# Patient Record
Sex: MALE | Race: WHITE | Employment: OTHER | URBAN - METROPOLITAN AREA
[De-identification: names, ages, dates, MRNs, and addresses within clinical notes are randomized per-mention and may not be internally consistent; named-entity substitution may affect disease eponyms.]

---

## 2020-09-01 ENCOUNTER — OFFICE VISIT (OUTPATIENT)
Dept: INTERNAL MEDICINE CLINIC | Facility: CLINIC | Age: 70
End: 2020-09-01
Payer: MEDICARE

## 2020-09-01 VITALS
HEART RATE: 67 BPM | SYSTOLIC BLOOD PRESSURE: 125 MMHG | DIASTOLIC BLOOD PRESSURE: 80 MMHG | HEIGHT: 71 IN | OXYGEN SATURATION: 96 % | WEIGHT: 199 LBS | BODY MASS INDEX: 27.86 KG/M2

## 2020-09-01 DIAGNOSIS — M17.0 PRIMARY OSTEOARTHRITIS OF BOTH KNEES: ICD-10-CM

## 2020-09-01 DIAGNOSIS — E78.5 DYSLIPIDEMIA: ICD-10-CM

## 2020-09-01 DIAGNOSIS — R06.00 DOE (DYSPNEA ON EXERTION): Primary | ICD-10-CM

## 2020-09-01 DIAGNOSIS — K21.9 GASTROESOPHAGEAL REFLUX DISEASE WITHOUT ESOPHAGITIS: ICD-10-CM

## 2020-09-01 DIAGNOSIS — Z00.00 ADULT GENERAL MEDICAL EXAM: ICD-10-CM

## 2020-09-01 PROBLEM — F32.A DEPRESSION: Status: ACTIVE | Noted: 2020-09-01

## 2020-09-01 PROBLEM — A60.01 HERPES SIMPLEX INFECTION OF PENIS: Status: ACTIVE | Noted: 2020-09-01

## 2020-09-01 PROCEDURE — 99204 OFFICE O/P NEW MOD 45 MIN: CPT | Performed by: INTERNAL MEDICINE

## 2020-09-01 RX ORDER — DICLOFENAC SODIUM 75 MG/1
1 TABLET, DELAYED RELEASE ORAL DAILY
COMMUNITY
Start: 2020-08-17 | End: 2020-10-13

## 2020-09-01 RX ORDER — VALACYCLOVIR HYDROCHLORIDE 1 G/1
1 TABLET, FILM COATED ORAL AS NEEDED
COMMUNITY
Start: 2020-08-16

## 2020-09-01 RX ORDER — COVID-19 ANTIGEN TEST
220 KIT MISCELLANEOUS
COMMUNITY
End: 2020-10-13

## 2020-09-01 RX ORDER — OMEPRAZOLE 20 MG/1
1 CAPSULE, DELAYED RELEASE ORAL DAILY
COMMUNITY
Start: 2020-08-17

## 2020-09-01 RX ORDER — BUPROPION HYDROCHLORIDE 150 MG/1
1 TABLET ORAL DAILY
COMMUNITY
Start: 2020-08-16 | End: 2021-01-26

## 2020-09-01 RX ORDER — ATORVASTATIN CALCIUM 10 MG/1
1 TABLET, FILM COATED ORAL NIGHTLY
COMMUNITY
Start: 2020-08-16 | End: 2021-06-15

## 2020-09-01 RX ORDER — ESCITALOPRAM OXALATE 20 MG/1
1 TABLET ORAL DAILY
COMMUNITY
Start: 2020-07-12 | End: 2021-06-15

## 2020-09-01 NOTE — PROGRESS NOTES
Amelia Markham is a 78year old male. Patient presents with:  Establish Care: needs new PCP  Breathing Problem: with exertion  Urinary Frequency    HPI:   26-year-old gentleman with past medical history of dyslipidemia, GERD, DJD, depression here to establish Frequency: 4 or more times a week      Drinks per session: 1 or 2    Drug use: Never     Family History   Problem Relation Age of Onset   • Heart Disorder Father    • Other (alzhiemers) Mother       No Known Allergies     REVIEW OF SYSTEMS:     Review of S Neurological: He is alert and oriented to person, place, and time. No cranial nerve deficit. Coordination normal.   Skin: Skin is warm and dry. Psychiatric: He has a normal mood and affect.  His behavior is normal. Judgment normal.         ASSESSMENT AN

## 2020-09-14 ENCOUNTER — APPOINTMENT (OUTPATIENT)
Dept: LAB | Facility: HOSPITAL | Age: 70
End: 2020-09-14
Attending: INTERNAL MEDICINE
Payer: MEDICARE

## 2020-09-14 DIAGNOSIS — R06.00 DOE (DYSPNEA ON EXERTION): ICD-10-CM

## 2020-09-15 LAB — SARS-COV-2 RNA RESP QL NAA+PROBE: NOT DETECTED

## 2020-09-16 ENCOUNTER — HOSPITAL ENCOUNTER (OUTPATIENT)
Dept: CV DIAGNOSTICS | Facility: HOSPITAL | Age: 70
Discharge: HOME | End: 2020-09-16
Attending: INTERNAL MEDICINE
Payer: MEDICARE

## 2020-09-16 DIAGNOSIS — R06.00 DOE (DYSPNEA ON EXERTION): ICD-10-CM

## 2020-09-16 PROCEDURE — 93017 CV STRESS TEST TRACING ONLY: CPT | Performed by: INTERNAL MEDICINE

## 2020-09-16 PROCEDURE — 93016 CV STRESS TEST SUPVJ ONLY: CPT | Performed by: INTERNAL MEDICINE

## 2020-09-16 PROCEDURE — 93350 STRESS TTE ONLY: CPT | Performed by: INTERNAL MEDICINE

## 2020-09-16 PROCEDURE — 93018 CV STRESS TEST I&R ONLY: CPT | Performed by: INTERNAL MEDICINE

## 2020-09-18 NOTE — PROGRESS NOTES
Dr Nicholas=please check cardiology tab-under EKG echo=click the EKG tracing and you will find the stress test, if this is not the one that you are looking for, send it back to us Triage.     EKG PACS Images      Show images for CARD EKG ECHO TRACING      Ca

## 2020-09-21 ENCOUNTER — MED REC SCAN ONLY (OUTPATIENT)
Dept: INTERNAL MEDICINE CLINIC | Facility: CLINIC | Age: 70
End: 2020-09-21

## 2020-10-01 ENCOUNTER — LAB ENCOUNTER (OUTPATIENT)
Dept: LAB | Facility: HOSPITAL | Age: 70
End: 2020-10-01
Attending: INTERNAL MEDICINE
Payer: MEDICARE

## 2020-10-01 DIAGNOSIS — Z00.00 ADULT GENERAL MEDICAL EXAM: ICD-10-CM

## 2020-10-01 PROCEDURE — 80053 COMPREHEN METABOLIC PANEL: CPT

## 2020-10-01 PROCEDURE — 83036 HEMOGLOBIN GLYCOSYLATED A1C: CPT

## 2020-10-01 PROCEDURE — 85027 COMPLETE CBC AUTOMATED: CPT

## 2020-10-01 PROCEDURE — 36415 COLL VENOUS BLD VENIPUNCTURE: CPT

## 2020-10-01 PROCEDURE — 84443 ASSAY THYROID STIM HORMONE: CPT

## 2020-10-01 PROCEDURE — 80061 LIPID PANEL: CPT

## 2020-10-01 NOTE — PROGRESS NOTES
Blood work reviewed. Blood counts, kidney function, liver function are normal limits. No evidence of diabetes. Cholesterol numbers are in acceptable levels. Thyroid test is normal.    Prostate number called PSA slightly in the higher side.   I have plac

## 2020-10-02 ENCOUNTER — TELEPHONE (OUTPATIENT)
Dept: INTERNAL MEDICINE CLINIC | Facility: CLINIC | Age: 70
End: 2020-10-02

## 2020-10-02 DIAGNOSIS — Z20.822 SUSPECTED COVID-19 VIRUS INFECTION: Primary | ICD-10-CM

## 2020-10-02 NOTE — TELEPHONE ENCOUNTER
While speaking with patient RE: test results, patient reports he is going to [facility] for a rapid Covid test, as he is trying to get on a flight to [place].     \"Tell Dr. Jesus Alberto Lopez thank you for speaking to me this morning, but they told me it would be 3

## 2020-10-12 ENCOUNTER — OFFICE VISIT (OUTPATIENT)
Dept: SURGERY | Facility: CLINIC | Age: 70
End: 2020-10-12
Payer: MEDICARE

## 2020-10-12 VITALS
HEIGHT: 71 IN | RESPIRATION RATE: 16 BRPM | WEIGHT: 199 LBS | DIASTOLIC BLOOD PRESSURE: 80 MMHG | BODY MASS INDEX: 27.86 KG/M2 | SYSTOLIC BLOOD PRESSURE: 132 MMHG | HEART RATE: 60 BPM

## 2020-10-12 DIAGNOSIS — R97.20 ELEVATED PSA: Primary | ICD-10-CM

## 2020-10-12 DIAGNOSIS — R35.1 BENIGN PROSTATIC HYPERPLASIA WITH NOCTURIA: ICD-10-CM

## 2020-10-12 DIAGNOSIS — N40.1 BENIGN PROSTATIC HYPERPLASIA WITH NOCTURIA: ICD-10-CM

## 2020-10-12 PROCEDURE — G0463 HOSPITAL OUTPT CLINIC VISIT: HCPCS | Performed by: NURSE PRACTITIONER

## 2020-10-12 PROCEDURE — 99204 OFFICE O/P NEW MOD 45 MIN: CPT | Performed by: NURSE PRACTITIONER

## 2020-10-12 RX ORDER — DICLOFENAC SODIUM 75 MG/1
TABLET, DELAYED RELEASE ORAL
COMMUNITY
Start: 2020-08-17 | End: 2021-07-01

## 2020-10-12 RX ORDER — SILDENAFIL 50 MG/1
50 TABLET, FILM COATED ORAL
COMMUNITY
Start: 2019-10-21

## 2020-10-12 RX ORDER — TAMSULOSIN HYDROCHLORIDE 0.4 MG/1
0.4 CAPSULE ORAL DAILY
Qty: 90 CAPSULE | Refills: 0 | Status: SHIPPED | OUTPATIENT
Start: 2020-10-12 | End: 2020-11-11

## 2020-10-12 NOTE — PROGRESS NOTES
HPI:    Patient ID: Robert Chandler is a 78year old male. HPI     Patient is a 78year old male who presents to the clinic for a consult regarding elevated PSA. Past medical history of anxieity depression, RLS, and HEYDI.     Patient previously had his medic Current Outpatient Medications   Medication Sig Dispense Refill   • atorvastatin 10 MG Oral Tab Take 1 tablet by mouth nightly. • buPROPion HCl ER, XL, 150 MG Oral Tablet 24 Hr Take 1 tablet by mouth daily.      • Diclofenac Sodium 75 MG Oral Ta 5. 22 ng/ml  20 PSA 4.09 ng/ml  10/21/19 PSA 5.22 ng/ml  19 PSA 5.65 ng/ml  18 PSA 4.22 ng/ml  18 PSA 4.61 ng/ml  18 PSA 3.42 ng/ml  3/13/18 PSA 4.21 ng/ml  10/3/17 PSA 5.15 ng/ml    Review of Imagin20 CT Chest without contra prescriptions requested or ordered in this encounter       Imaging & Referrals:  None        XZ#[id number]

## 2020-10-13 ENCOUNTER — MED REC SCAN ONLY (OUTPATIENT)
Dept: INTERNAL MEDICINE CLINIC | Facility: CLINIC | Age: 70
End: 2020-10-13

## 2020-10-13 ENCOUNTER — OFFICE VISIT (OUTPATIENT)
Dept: INTERNAL MEDICINE CLINIC | Facility: CLINIC | Age: 70
End: 2020-10-13
Payer: MEDICARE

## 2020-10-13 VITALS
BODY MASS INDEX: 33 KG/M2 | DIASTOLIC BLOOD PRESSURE: 77 MMHG | OXYGEN SATURATION: 96 % | WEIGHT: 236 LBS | TEMPERATURE: 98 F | SYSTOLIC BLOOD PRESSURE: 119 MMHG | HEART RATE: 60 BPM

## 2020-10-13 DIAGNOSIS — R97.20 ELEVATED PSA: ICD-10-CM

## 2020-10-13 DIAGNOSIS — Z23 NEED FOR INFLUENZA VACCINATION: ICD-10-CM

## 2020-10-13 DIAGNOSIS — E78.5 DYSLIPIDEMIA: Primary | ICD-10-CM

## 2020-10-13 DIAGNOSIS — E66.09 OBESITY DUE TO EXCESS CALORIES WITHOUT SERIOUS COMORBIDITY, UNSPECIFIED CLASSIFICATION: ICD-10-CM

## 2020-10-13 DIAGNOSIS — K21.9 GASTROESOPHAGEAL REFLUX DISEASE WITHOUT ESOPHAGITIS: ICD-10-CM

## 2020-10-13 PROCEDURE — G0008 ADMIN INFLUENZA VIRUS VAC: HCPCS | Performed by: INTERNAL MEDICINE

## 2020-10-13 PROCEDURE — 90662 IIV NO PRSV INCREASED AG IM: CPT | Performed by: INTERNAL MEDICINE

## 2020-10-13 PROCEDURE — G0463 HOSPITAL OUTPT CLINIC VISIT: HCPCS | Performed by: INTERNAL MEDICINE

## 2020-10-13 PROCEDURE — 99214 OFFICE O/P EST MOD 30 MIN: CPT | Performed by: INTERNAL MEDICINE

## 2020-10-13 NOTE — PROGRESS NOTES
Ayden Lobo is a 78year old male. Patient presents with: Follow - Up: dyspenea /flu vaccine  Follow-up for dyspnea, chronic medical conditions  HPI:   65-year-old gentleman with a past medical history of GERD, dyslipidemia, depression here for follow-up. Age of Onset   • Heart Disorder Father    • Other (alzhiemers) Mother       No Known Allergies     REVIEW OF SYSTEMS:     Review of Systems   Constitutional: Negative for activity change, appetite change and fever.    HENT: Negative for congestion, facial s unspecified classification  Discussed regarding increasing activity. Increase activity as tolerated. Cut down on calories and carbohydrates. He does have prediabetes. He informed me that he will start playing tennis    5.  Elevated PSA  Follows up with

## 2021-01-06 RX ORDER — TAMSULOSIN HYDROCHLORIDE 0.4 MG/1
CAPSULE ORAL
Qty: 90 CAPSULE | Refills: 0 | Status: SHIPPED | OUTPATIENT
Start: 2021-01-06 | End: 2021-04-16

## 2021-01-06 NOTE — TELEPHONE ENCOUNTER
Received refill request for tamsulosin 0.4mg capsules    Patient has been seen within the last 12 months but last PSA from  10/2020 was elevated    Routed to JEFF Neri for review and advise   Medication pended if appropriate to sign

## 2021-01-26 RX ORDER — BUPROPION HYDROCHLORIDE 150 MG/1
150 TABLET ORAL DAILY
Qty: 90 TABLET | Refills: 0 | Status: SHIPPED | OUTPATIENT
Start: 2021-01-26 | End: 2021-05-14

## 2021-02-09 ENCOUNTER — ORDER TRANSCRIPTION (OUTPATIENT)
Dept: PHYSICAL THERAPY | Facility: HOSPITAL | Age: 71
End: 2021-02-09

## 2021-02-09 ENCOUNTER — OFFICE VISIT (OUTPATIENT)
Dept: INTERNAL MEDICINE CLINIC | Facility: CLINIC | Age: 71
End: 2021-02-09
Payer: MEDICARE

## 2021-02-09 VITALS
RESPIRATION RATE: 14 BRPM | WEIGHT: 237 LBS | OXYGEN SATURATION: 97 % | HEIGHT: 71 IN | DIASTOLIC BLOOD PRESSURE: 82 MMHG | SYSTOLIC BLOOD PRESSURE: 120 MMHG | HEART RATE: 74 BPM | BODY MASS INDEX: 33.18 KG/M2

## 2021-02-09 DIAGNOSIS — M17.0 PRIMARY OSTEOARTHRITIS OF BOTH KNEES: ICD-10-CM

## 2021-02-09 DIAGNOSIS — N40.0 BENIGN PROSTATIC HYPERPLASIA WITHOUT LOWER URINARY TRACT SYMPTOMS: ICD-10-CM

## 2021-02-09 DIAGNOSIS — N40.0 BENIGN PROSTATIC HYPERPLASIA, UNSPECIFIED WHETHER LOWER URINARY TRACT SYMPTOMS PRESENT: ICD-10-CM

## 2021-02-09 DIAGNOSIS — F32.5 MAJOR DEPRESSIVE DISORDER IN FULL REMISSION, UNSPECIFIED WHETHER RECURRENT (HCC): ICD-10-CM

## 2021-02-09 DIAGNOSIS — R42 DIZZINESS: Primary | ICD-10-CM

## 2021-02-09 DIAGNOSIS — K21.9 GASTROESOPHAGEAL REFLUX DISEASE WITHOUT ESOPHAGITIS: ICD-10-CM

## 2021-02-09 DIAGNOSIS — A60.01 HERPES SIMPLEX INFECTION OF PENIS: ICD-10-CM

## 2021-02-09 DIAGNOSIS — E66.09 OBESITY DUE TO EXCESS CALORIES WITHOUT SERIOUS COMORBIDITY, UNSPECIFIED CLASSIFICATION: ICD-10-CM

## 2021-02-09 DIAGNOSIS — R97.20 ELEVATED PSA: ICD-10-CM

## 2021-02-09 DIAGNOSIS — E78.5 DYSLIPIDEMIA: ICD-10-CM

## 2021-02-09 PROCEDURE — G0439 PPPS, SUBSEQ VISIT: HCPCS | Performed by: INTERNAL MEDICINE

## 2021-02-10 NOTE — PROGRESS NOTES
HPI:   Irma Rebolledo is a 69year old male who presents for a Medicare Subsequent Annual Wellness visit (Pt already had Initial Annual Wellness). 67-year-old gentleman here for Medicare annual wellness visit. He was living in [place].   He has no complain osteoarthritis of both knees     Herpes simplex infection of penis     Depression     Obesity due to excess calories without serious comorbidity     Benign prostatic hyperplasia without lower urinary tract symptoms    Wt Readings from Last 3 Encounters:  0 change, fever and unexpected weight change. HENT: Negative for congestion, ear pain, nosebleeds and sore throat. Eyes: Negative for pain. Respiratory: Negative for cough, chest tightness and wheezing.     Cardiovascular: Negative for chest pain, palp have trouble understanding the speaker in a large room such as at a meeting or place of Mormonism: No   Many people I talk to seem to mumble (or don't speak clearly): No People get annoyed because I misunderstand what they say: Sometimes   I misunderstand wh Pneumococcal (Prevnar 13) 07/10/2015   • Pneumovax 23 05/09/2017   • TDAP 05/29/2008, 08/01/2019   • Yellow Fever 06/24/2011   • Zoster Vaccine Live (Zostavax) 05/11/2010   • Zoster Vaccine Recombinant Adjuvanted (Shingrix) 05/01/2018, 11/09/2018        AS understanding of these issues and agrees to the plan. Reinforced healthy diet, lifestyle, and exercise. No follow-ups on file.      Rena Arredondo MD, 2/9/2021     General Health     In the past six months, have you lost more than 10 pounds without try Once after 65 05/09/2017 Please get once after your 65th birthday    Hepatitis B for Moderate/High Risk No vaccine history found Medium/high risk factors:   End-stage renal disease   Hemophiliacs who received Factor VIII or IX concentrates   Clients of ins

## 2021-02-18 ENCOUNTER — TELEPHONE (OUTPATIENT)
Dept: PHYSICAL THERAPY | Facility: HOSPITAL | Age: 71
End: 2021-02-18

## 2021-02-20 ENCOUNTER — IMMUNIZATION (OUTPATIENT)
Dept: LAB | Facility: HOSPITAL | Age: 71
End: 2021-02-20
Attending: EMERGENCY MEDICINE
Payer: MEDICARE

## 2021-02-20 DIAGNOSIS — Z23 NEED FOR VACCINATION: Primary | ICD-10-CM

## 2021-02-20 PROCEDURE — 0012A SARSCOV2 VAC 100MCG/0.5ML IM: CPT

## 2021-02-23 ENCOUNTER — OFFICE VISIT (OUTPATIENT)
Dept: PHYSICAL THERAPY | Facility: HOSPITAL | Age: 71
End: 2021-02-23
Attending: INTERNAL MEDICINE
Payer: MEDICARE

## 2021-02-23 DIAGNOSIS — R42 DIZZINESS: ICD-10-CM

## 2021-02-23 PROCEDURE — 95992 CANALITH REPOSITIONING PROC: CPT

## 2021-02-23 PROCEDURE — 97161 PT EVAL LOW COMPLEX 20 MIN: CPT

## 2021-02-23 NOTE — PROGRESS NOTES
VESTIBULAR EVALUATION:   Referring Physician: Eamon Song  Diagnosis: Dizziness, BPPV     Date of Service: 2/23/2021   Insurance:  Medicare      PATIENT SUMMARY   Getachew Aldana is a 69year old male who presents to therapy today with reports of positional diz Physical Therapy is medically necessary to address the above impairments and reach functional goals.     Precautions:  None  OBJECTIVE:   Physical Exam:  Posture/Observation: good posture, no assistive device   Neuro Screen: Sensation: WNL all extremities visits)  1. Negative Westpoint-Hallpike and roll tests. 2.  Able to perform position changes without dizziness. 3.  Balance testing WNL. Frequency / Duration: Patient will be seen for 1-2 x/week or a total of 8 visits over a 90 day period.  Treatment will i

## 2021-02-25 ENCOUNTER — OFFICE VISIT (OUTPATIENT)
Dept: PHYSICAL THERAPY | Facility: HOSPITAL | Age: 71
End: 2021-02-25
Attending: INTERNAL MEDICINE
Payer: MEDICARE

## 2021-02-25 DIAGNOSIS — R42 DIZZINESS: ICD-10-CM

## 2021-02-25 PROCEDURE — 95992 CANALITH REPOSITIONING PROC: CPT

## 2021-02-25 NOTE — PROGRESS NOTES
Date  2/25/21           Visit Number 2/8           NMR            Ther EX            Ther Act            Gait Training            CRM  x           Manual              Dx: BPPV         Insurance:  Medicare    Visit # authorized:  8 per POC  Referring MD:

## 2021-03-02 ENCOUNTER — OFFICE VISIT (OUTPATIENT)
Dept: PHYSICAL THERAPY | Facility: HOSPITAL | Age: 71
End: 2021-03-02
Attending: INTERNAL MEDICINE
Payer: MEDICARE

## 2021-03-02 DIAGNOSIS — R42 DIZZINESS: ICD-10-CM

## 2021-03-02 PROCEDURE — 97112 NEUROMUSCULAR REEDUCATION: CPT

## 2021-03-02 NOTE — PROGRESS NOTES
Patient Name: Ramírez Spaulding, : 1950, MRN: [id number]   Date:  3/2/2021  Referring Physician:  Jesus St    Diagnosis:  BPPV- right posterior canal    Discharge Summary  Pt has attended 3 visits in physical therapy.     Progress Note Start

## 2021-03-04 ENCOUNTER — APPOINTMENT (OUTPATIENT)
Dept: PHYSICAL THERAPY | Facility: HOSPITAL | Age: 71
End: 2021-03-04
Attending: INTERNAL MEDICINE
Payer: MEDICARE

## 2021-03-09 ENCOUNTER — APPOINTMENT (OUTPATIENT)
Dept: PHYSICAL THERAPY | Facility: HOSPITAL | Age: 71
End: 2021-03-09
Attending: INTERNAL MEDICINE
Payer: MEDICARE

## 2021-03-10 DIAGNOSIS — G47.33 OSA (OBSTRUCTIVE SLEEP APNEA): Primary | ICD-10-CM

## 2021-03-11 ENCOUNTER — APPOINTMENT (OUTPATIENT)
Dept: PHYSICAL THERAPY | Facility: HOSPITAL | Age: 71
End: 2021-03-11
Attending: INTERNAL MEDICINE
Payer: MEDICARE

## 2021-03-16 ENCOUNTER — APPOINTMENT (OUTPATIENT)
Dept: PHYSICAL THERAPY | Facility: HOSPITAL | Age: 71
End: 2021-03-16
Attending: INTERNAL MEDICINE
Payer: MEDICARE

## 2021-03-19 ENCOUNTER — APPOINTMENT (OUTPATIENT)
Dept: PHYSICAL THERAPY | Facility: HOSPITAL | Age: 71
End: 2021-03-19
Attending: INTERNAL MEDICINE
Payer: MEDICARE

## 2021-03-23 ENCOUNTER — APPOINTMENT (OUTPATIENT)
Dept: PHYSICAL THERAPY | Facility: HOSPITAL | Age: 71
End: 2021-03-23
Attending: INTERNAL MEDICINE
Payer: MEDICARE

## 2021-03-25 ENCOUNTER — APPOINTMENT (OUTPATIENT)
Dept: PHYSICAL THERAPY | Facility: HOSPITAL | Age: 71
End: 2021-03-25
Attending: INTERNAL MEDICINE
Payer: MEDICARE

## 2021-03-30 ENCOUNTER — APPOINTMENT (OUTPATIENT)
Dept: PHYSICAL THERAPY | Facility: HOSPITAL | Age: 71
End: 2021-03-30
Attending: INTERNAL MEDICINE
Payer: MEDICARE

## 2021-04-16 RX ORDER — TAMSULOSIN HYDROCHLORIDE 0.4 MG/1
CAPSULE ORAL
Qty: 90 CAPSULE | Refills: 0 | Status: SHIPPED | OUTPATIENT
Start: 2021-04-16 | End: 2021-05-26

## 2021-04-16 NOTE — TELEPHONE ENCOUNTER
Received refill request for tamsulosin 0.4 mg capsules  Patient's LOV within 12 months  Patient instructed to continue medication during last visit   Has an upcoming appt with QUINTEN in the next 2 months   Medication meets refill criteria protocol, med approv

## 2021-05-13 NOTE — TELEPHONE ENCOUNTER
Please call pt and see whether he is taking this med  If yes , we will send it Thanks    Jazzy Amaya

## 2021-05-13 NOTE — TELEPHONE ENCOUNTER
MESSAGE:   RE:  Bupropion XL 150mg          1 tab PO daily    Pharmacy asking for refill on this medication, I dont see this med on med list. I do see that it was DC back Aug 2020, from previous PCP. Please advise.

## 2021-05-14 RX ORDER — BUPROPION HYDROCHLORIDE 150 MG/1
150 TABLET ORAL DAILY
Qty: 90 TABLET | Refills: 0 | Status: SHIPPED | OUTPATIENT
Start: 2021-05-14 | End: 2021-08-12

## 2021-05-14 NOTE — TELEPHONE ENCOUNTER
Pt stated he still takes Bupropion XL 150mg          1 tab PO daily and would like a refill. Thank you.

## 2021-05-26 RX ORDER — TAMSULOSIN HYDROCHLORIDE 0.4 MG/1
CAPSULE ORAL
Qty: 90 CAPSULE | Refills: 0 | Status: SHIPPED | OUTPATIENT
Start: 2021-05-26 | End: 2021-10-05

## 2021-05-26 NOTE — TELEPHONE ENCOUNTER
Received refill request for tamsulosin 0.4 mg   Patient's LOV within 12 months  Patient scheduled for OV with BYRON 6/2021  Last PSA-Elevated, patient instructed to continue tamsulosin during last visit   Medication meets refill criteria protocol, med approv

## 2021-06-03 ENCOUNTER — OFFICE VISIT (OUTPATIENT)
Dept: PULMONOLOGY | Facility: CLINIC | Age: 71
End: 2021-06-03
Payer: MEDICARE

## 2021-06-03 VITALS
HEIGHT: 72 IN | OXYGEN SATURATION: 97 % | HEART RATE: 61 BPM | DIASTOLIC BLOOD PRESSURE: 61 MMHG | SYSTOLIC BLOOD PRESSURE: 127 MMHG | BODY MASS INDEX: 31.42 KG/M2 | RESPIRATION RATE: 18 BRPM | WEIGHT: 232 LBS

## 2021-06-03 DIAGNOSIS — G47.33 OSA (OBSTRUCTIVE SLEEP APNEA): Primary | ICD-10-CM

## 2021-06-03 PROCEDURE — 99203 OFFICE O/P NEW LOW 30 MIN: CPT | Performed by: INTERNAL MEDICINE

## 2021-06-03 NOTE — PROGRESS NOTES
Dear Molly Jacob: As you know, Sally Gaitan is a 21-year-old male who I am now evaluating for sleep disturbance. HISTORY OF PRESENT ILLNESS: The patient has a longstanding problem with sleep.   He notes that his main problems are that he cannot sleep at MEDICATIONS: No known drug allergy    MEDICATIONS: Atorvastatin escitalopram omeprazole valacyclovir diclofenac sildenafil bupropion tamsulosin    REVIEW OF SYSTEMS: Review of Systems:  Vision normal. Ear nose and throat normal early hearing loss.  Bowel no sedating drug  7. Never drive if at all sleepy  8. Sleep apnea, insomnia and sleep hygiene literature provided  9. See me again at 3-month interval or sooner if needed  10. Avoid napping  11.   Keep all electronics and distractors out of the bedroom

## 2021-06-08 ENCOUNTER — OFFICE VISIT (OUTPATIENT)
Dept: SURGERY | Facility: CLINIC | Age: 71
End: 2021-06-08
Payer: MEDICARE

## 2021-06-08 VITALS
BODY MASS INDEX: 31.42 KG/M2 | HEIGHT: 72 IN | WEIGHT: 232 LBS | SYSTOLIC BLOOD PRESSURE: 134 MMHG | DIASTOLIC BLOOD PRESSURE: 81 MMHG | HEART RATE: 65 BPM

## 2021-06-08 DIAGNOSIS — R35.1 BENIGN PROSTATIC HYPERPLASIA WITH NOCTURIA: ICD-10-CM

## 2021-06-08 DIAGNOSIS — R97.20 ELEVATED PSA: Primary | ICD-10-CM

## 2021-06-08 DIAGNOSIS — N40.1 BENIGN PROSTATIC HYPERPLASIA WITH NOCTURIA: ICD-10-CM

## 2021-06-08 PROCEDURE — 99203 OFFICE O/P NEW LOW 30 MIN: CPT | Performed by: UROLOGY

## 2021-06-08 RX ORDER — TAMSULOSIN HYDROCHLORIDE 0.4 MG/1
0.4 CAPSULE ORAL DAILY
Qty: 90 CAPSULE | Refills: 3 | Status: SHIPPED | OUTPATIENT
Start: 2021-06-08 | End: 2021-06-15

## 2021-06-08 NOTE — PROGRESS NOTES
SUBJECTIVE:  Nilo Sawyer is a 69year old male who presents for a consultation at the request of, and a copy of this note will be sent to, Dr. Edin Jade, for evaluation of  benign prostatic hyperplasia and elevated PSA.  He states that the problem is benign prostatic hyperplasia. No pelvic lymphadenopathy. Urinary bladder is trabeculated, likely related to chronic bladder outlet obstruction.          Allergies: No Known Allergies    History:  Past Medical History:   Diagnosis Date   • Anxiety    • Dep in appearance      Right Epididymis and Vas: both are palpably normal.      Right Testis: normal        Left Epididymis and Vas: both are palpably normal.      Left Testis: normal        Inguinal Lymph Nodes: non-palpable both      Skin exam grossly normal

## 2021-06-15 ENCOUNTER — OFFICE VISIT (OUTPATIENT)
Dept: INTERNAL MEDICINE CLINIC | Facility: CLINIC | Age: 71
End: 2021-06-15
Payer: MEDICARE

## 2021-06-15 VITALS
BODY MASS INDEX: 31.42 KG/M2 | DIASTOLIC BLOOD PRESSURE: 84 MMHG | WEIGHT: 232 LBS | SYSTOLIC BLOOD PRESSURE: 120 MMHG | HEIGHT: 72 IN | HEART RATE: 74 BPM | RESPIRATION RATE: 14 BRPM | OXYGEN SATURATION: 97 %

## 2021-06-15 DIAGNOSIS — E78.5 DYSLIPIDEMIA: Primary | ICD-10-CM

## 2021-06-15 DIAGNOSIS — N40.0 BENIGN PROSTATIC HYPERPLASIA WITHOUT LOWER URINARY TRACT SYMPTOMS: ICD-10-CM

## 2021-06-15 DIAGNOSIS — F32.5 MAJOR DEPRESSIVE DISORDER IN FULL REMISSION, UNSPECIFIED WHETHER RECURRENT (HCC): ICD-10-CM

## 2021-06-15 DIAGNOSIS — M17.0 PRIMARY OSTEOARTHRITIS OF BOTH KNEES: ICD-10-CM

## 2021-06-15 PROCEDURE — 99214 OFFICE O/P EST MOD 30 MIN: CPT | Performed by: INTERNAL MEDICINE

## 2021-06-15 RX ORDER — ATORVASTATIN CALCIUM 10 MG/1
10 TABLET, FILM COATED ORAL NIGHTLY
Qty: 90 TABLET | Refills: 2 | Status: SHIPPED | OUTPATIENT
Start: 2021-06-15 | End: 2021-09-13

## 2021-06-15 RX ORDER — ESCITALOPRAM OXALATE 20 MG/1
20 TABLET ORAL DAILY
Qty: 90 TABLET | Refills: 1 | Status: SHIPPED | OUTPATIENT
Start: 2021-06-15 | End: 2021-09-13

## 2021-06-16 NOTE — PROGRESS NOTES
Yuri Mcqueen is a 69year old male. Patient presents with: Follow - Up    HPI:   67-year-old gentleman with a past medical history of dyslipidemia, BPH, depression, DJD here for follow-up.   He reports that he is doing okay except his arthritis is bothering REVIEW OF SYSTEMS:     Review of Systems   Constitutional: Negative for activity change, appetite change and fever. HENT: Negative for congestion and voice change. Respiratory: Negative for cough and shortness of breath.     Cardiovascular: Negativ Dyslipidemia  Continue statins. Check lipid profile and LFT  - CBC, PLATELET; NO DIFFERENTIAL; Future  - COMP METABOLIC PANEL (14); Future  - LIPID PANEL; Future    2.  Benign prostatic hyperplasia without lower urinary tract symptoms  Last seen by urology

## 2021-06-29 ENCOUNTER — LAB ENCOUNTER (OUTPATIENT)
Dept: LAB | Facility: HOSPITAL | Age: 71
End: 2021-06-29
Attending: INTERNAL MEDICINE
Payer: MEDICARE

## 2021-06-29 DIAGNOSIS — F32.5 MAJOR DEPRESSIVE DISORDER IN FULL REMISSION, UNSPECIFIED WHETHER RECURRENT (HCC): ICD-10-CM

## 2021-06-29 DIAGNOSIS — M17.0 PRIMARY OSTEOARTHRITIS OF BOTH KNEES: ICD-10-CM

## 2021-06-29 DIAGNOSIS — E78.5 DYSLIPIDEMIA: ICD-10-CM

## 2021-06-29 LAB
ALBUMIN SERPL-MCNC: 3.7 G/DL (ref 3.4–5)
ALBUMIN/GLOB SERPL: 1 {RATIO} (ref 1–2)
ALP LIVER SERPL-CCNC: 73 U/L
ALT SERPL-CCNC: 31 U/L
ANION GAP SERPL CALC-SCNC: 7 MMOL/L (ref 0–18)
AST SERPL-CCNC: 19 U/L (ref 15–37)
BILIRUB SERPL-MCNC: 0.9 MG/DL (ref 0.1–2)
BUN BLD-MCNC: 29 MG/DL (ref 7–18)
BUN/CREAT SERPL: 24.8 (ref 10–20)
CALCIUM BLD-MCNC: 8.9 MG/DL (ref 8.5–10.1)
CHLORIDE SERPL-SCNC: 108 MMOL/L (ref 98–112)
CHOLEST SMN-MCNC: 152 MG/DL (ref ?–200)
CO2 SERPL-SCNC: 26 MMOL/L (ref 21–32)
CREAT BLD-MCNC: 1.17 MG/DL
DEPRECATED RDW RBC AUTO: 46.9 FL (ref 35.1–46.3)
ERYTHROCYTE [DISTWIDTH] IN BLOOD BY AUTOMATED COUNT: 13.6 % (ref 11–15)
GLOBULIN PLAS-MCNC: 3.6 G/DL (ref 2.8–4.4)
GLUCOSE BLD-MCNC: 97 MG/DL (ref 70–99)
HCT VFR BLD AUTO: 45.1 %
HDLC SERPL-MCNC: 45 MG/DL (ref 40–59)
HGB BLD-MCNC: 14.4 G/DL
LDLC SERPL CALC-MCNC: 88 MG/DL (ref ?–100)
M PROTEIN MFR SERPL ELPH: 7.3 G/DL (ref 6.4–8.2)
MCH RBC QN AUTO: 30.2 PG (ref 26–34)
MCHC RBC AUTO-ENTMCNC: 31.9 G/DL (ref 31–37)
MCV RBC AUTO: 94.5 FL
NONHDLC SERPL-MCNC: 107 MG/DL (ref ?–130)
OSMOLALITY SERPL CALC.SUM OF ELEC: 298 MOSM/KG (ref 275–295)
PATIENT FASTING Y/N/NP: YES
PATIENT FASTING Y/N/NP: YES
PLATELET # BLD AUTO: 207 10(3)UL (ref 150–450)
POTASSIUM SERPL-SCNC: 3.9 MMOL/L (ref 3.5–5.1)
RBC # BLD AUTO: 4.77 X10(6)UL
RHEUMATOID FACT SERPL-ACNC: <10 IU/ML (ref ?–15)
SODIUM SERPL-SCNC: 141 MMOL/L (ref 136–145)
TRIGL SERPL-MCNC: 100 MG/DL (ref 30–149)
TSI SER-ACNC: 1.59 MIU/ML (ref 0.36–3.74)
VLDLC SERPL CALC-MCNC: 16 MG/DL (ref 0–30)
WBC # BLD AUTO: 5.2 X10(3) UL (ref 4–11)

## 2021-06-29 PROCEDURE — 86431 RHEUMATOID FACTOR QUANT: CPT

## 2021-06-29 PROCEDURE — 85027 COMPLETE CBC AUTOMATED: CPT

## 2021-06-29 PROCEDURE — 80061 LIPID PANEL: CPT

## 2021-06-29 PROCEDURE — 80053 COMPREHEN METABOLIC PANEL: CPT

## 2021-06-29 PROCEDURE — 36415 COLL VENOUS BLD VENIPUNCTURE: CPT

## 2021-06-29 PROCEDURE — 84443 ASSAY THYROID STIM HORMONE: CPT

## 2021-06-30 ENCOUNTER — OFFICE VISIT (OUTPATIENT)
Dept: SURGERY | Facility: CLINIC | Age: 71
End: 2021-06-30
Payer: MEDICARE

## 2021-06-30 VITALS
HEART RATE: 58 BPM | BODY MASS INDEX: 34.08 KG/M2 | SYSTOLIC BLOOD PRESSURE: 126 MMHG | OXYGEN SATURATION: 96 % | HEIGHT: 69.8 IN | WEIGHT: 235.38 LBS | DIASTOLIC BLOOD PRESSURE: 77 MMHG

## 2021-06-30 DIAGNOSIS — Z99.89 OSA ON CPAP: ICD-10-CM

## 2021-06-30 DIAGNOSIS — E66.9 OBESITY (BMI 30-39.9): ICD-10-CM

## 2021-06-30 DIAGNOSIS — E88.81 INSULIN RESISTANCE: Primary | ICD-10-CM

## 2021-06-30 DIAGNOSIS — R63.5 WEIGHT GAIN: ICD-10-CM

## 2021-06-30 DIAGNOSIS — G47.33 OSA ON CPAP: ICD-10-CM

## 2021-06-30 PROBLEM — E88.819 INSULIN RESISTANCE: Status: ACTIVE | Noted: 2021-06-30

## 2021-06-30 PROCEDURE — 99204 OFFICE O/P NEW MOD 45 MIN: CPT | Performed by: INTERNAL MEDICINE

## 2021-06-30 RX ORDER — HYDROCHLOROTHIAZIDE 12.5 MG/1
CAPSULE, GELATIN COATED ORAL
Qty: 90 CAPSULE | Refills: 0 | Status: SHIPPED | OUTPATIENT
Start: 2021-06-30 | End: 2021-10-04

## 2021-06-30 RX ORDER — HYDROCHLOROTHIAZIDE 12.5 MG/1
12.5 CAPSULE, GELATIN COATED ORAL DAILY
Qty: 30 CAPSULE | Refills: 1 | Status: SHIPPED | OUTPATIENT
Start: 2021-06-30 | End: 2021-06-30

## 2021-06-30 RX ORDER — DIETHYLPROPION HYDROCHLORIDE 75 MG/1
1 TABLET ORAL DAILY
Qty: 30 TABLET | Refills: 2 | Status: SHIPPED | OUTPATIENT
Start: 2021-06-30 | End: 2021-10-14

## 2021-06-30 NOTE — PROGRESS NOTES
The Wellness and Weight Loss Consultation Note       Patient:  Radha Suarez  :      1950  MRN:      [id number]    Referring Provider: Dr. Nicolle Castillo       Chief Complaint:  Patient presents with:  Weight Management: Non-surgical weight management. Oral Tablet 24 Hr Take 1 tablet (150 mg total) by mouth daily. 90 tablet 0   • Diclofenac Sodium 75 MG Oral Tab EC TAKE 1 TABLET BY MOUTH TWICE DAILY     • Sildenafil Citrate 50 MG Oral Tab Take 50 mg by mouth.  (Patient not taking: Reported on 6/8/2021 ) Minutes of Exercise per Session:   Stress:       Feeling of Stress :   Social Connections:       Frequency of Communication with Friends and Family:       Frequency of Social Gatherings with Friends and Family:       Attends Mormonism Services:       Activ other systems were reviewed and are negative. Physical Exam:  General appearance: alert, appears stated age, cooperative and moderately obese  Head: Normocephalic, without obvious abnormality, atraumatic  Back: symmetric, no curvature.  ROM normal. No CV wife      Diagnoses and all orders for this visit:    Insulin resistance    Weight gain    HEYDI on CPAP    Obesity (BMI 30-39.9)  -     Diethylpropion HCl ER 75 MG Oral Tablet 24 Hr; Take 1 tablet (75 mg total) by mouth daily.     Other orders  -     [facility]

## 2021-07-01 ENCOUNTER — TELEPHONE (OUTPATIENT)
Dept: PULMONOLOGY | Facility: CLINIC | Age: 71
End: 2021-07-01

## 2021-07-01 DIAGNOSIS — G47.33 OSA (OBSTRUCTIVE SLEEP APNEA): Primary | ICD-10-CM

## 2021-07-01 RX ORDER — DICLOFENAC SODIUM 75 MG/1
TABLET, DELAYED RELEASE ORAL
Qty: 30 TABLET | Refills: 2 | Status: SHIPPED | OUTPATIENT
Start: 2021-07-01 | End: 2021-10-03

## 2021-07-01 NOTE — TELEPHONE ENCOUNTER
Pt requesting a call back to confirm if he is using the correct setting for CPAP machine. Pt states pressure is currently set at 9 and inquiring if he should be using 12.  Please call [phone]

## 2021-07-02 NOTE — TELEPHONE ENCOUNTER
Dr. Nini Grullon received from [address] [place] [address]. Patient on autopap 9-20, mean pressure: 14 and AHI level 23.2. Patient thought he was on CPAP 9, but is on autopap.

## 2021-07-02 NOTE — TELEPHONE ENCOUNTER
Per OV note 6/3/21: \"He has a device set to 9 CWP which he tolerates acceptably. Patient may or may not be an effective setting. I suspect that he needs a value closer to 12 CWP, which he would likely tolerate and be significantly effective.   Sean Felix

## 2021-07-03 NOTE — TELEPHONE ENCOUNTER
RN, he still has 23 residual events on the current settings. Please increase his pressure range to 12-20 CWP. Then get a download in 2 to 3 months.

## 2021-07-06 NOTE — TELEPHONE ENCOUNTER
DME order and face sheet faxed to [facility] at [phone]. Fax confirmation received. Spoke with patient informed him of Dr. Deion Hinds order. Patient verbalized understanding and requests E's contact information sent to him via [employer].

## 2021-07-13 ENCOUNTER — HOSPITAL ENCOUNTER (OUTPATIENT)
Dept: GENERAL RADIOLOGY | Facility: HOSPITAL | Age: 71
Discharge: HOME | End: 2021-07-13
Attending: INTERNAL MEDICINE
Payer: MEDICARE

## 2021-07-13 ENCOUNTER — OFFICE VISIT (OUTPATIENT)
Dept: RHEUMATOLOGY | Facility: CLINIC | Age: 71
End: 2021-07-13
Payer: MEDICARE

## 2021-07-13 VITALS
WEIGHT: 235 LBS | BODY MASS INDEX: 34.02 KG/M2 | HEIGHT: 69.8 IN | SYSTOLIC BLOOD PRESSURE: 121 MMHG | HEART RATE: 65 BPM | DIASTOLIC BLOOD PRESSURE: 80 MMHG

## 2021-07-13 DIAGNOSIS — G89.29 CHRONIC RIGHT-SIDED LOW BACK PAIN WITH RIGHT-SIDED SCIATICA: ICD-10-CM

## 2021-07-13 DIAGNOSIS — M25.561 CHRONIC PAIN OF RIGHT KNEE: ICD-10-CM

## 2021-07-13 DIAGNOSIS — G89.29 CHRONIC PAIN OF RIGHT KNEE: ICD-10-CM

## 2021-07-13 DIAGNOSIS — M79.641 RIGHT HAND PAIN: ICD-10-CM

## 2021-07-13 DIAGNOSIS — M25.561 CHRONIC PAIN OF RIGHT KNEE: Primary | ICD-10-CM

## 2021-07-13 DIAGNOSIS — M54.41 CHRONIC RIGHT-SIDED LOW BACK PAIN WITH RIGHT-SIDED SCIATICA: ICD-10-CM

## 2021-07-13 DIAGNOSIS — G89.29 CHRONIC PAIN OF RIGHT KNEE: Primary | ICD-10-CM

## 2021-07-13 PROCEDURE — 73130 X-RAY EXAM OF HAND: CPT | Performed by: INTERNAL MEDICINE

## 2021-07-13 PROCEDURE — 73560 X-RAY EXAM OF KNEE 1 OR 2: CPT | Performed by: INTERNAL MEDICINE

## 2021-07-13 PROCEDURE — 99204 OFFICE O/P NEW MOD 45 MIN: CPT | Performed by: INTERNAL MEDICINE

## 2021-07-13 RX ORDER — METHYLPREDNISOLONE 4 MG/1
TABLET ORAL
Qty: 1 EACH | Refills: 0 | Status: SHIPPED | OUTPATIENT
Start: 2021-07-13

## 2021-07-13 NOTE — PATIENT INSTRUCTIONS
You were seen today for right knee and hand pain  Symptoms appear to be due to osteoarthritis which is wear-and-tear of your joints  Continue the diclofenac once or twice a day as needed.   Will take this on the day where your joint pain is severe  Can also

## 2021-07-13 NOTE — PROGRESS NOTES
Deirdre Hawk is a 69year old male who presents for No chief complaint on file. Gera Shin    HPI:   CC: knee and hand pain  Consult: referred by PCP Dr. Katy Martinez    This is a 68 yo M with hx of HLD, GERD, Depression, BPH, DJD presents with right knee and hand pain Oral Tab Take 1 tablet (20 mg total) by mouth daily. 90 tablet 1   • atorvastatin 10 MG Oral Tab Take 1 tablet (10 mg total) by mouth nightly.  90 tablet 2   • tamsulosin (FLOMAX) cap TAKE 1 CAPSULE(0.4 MG) BY MOUTH DAILY 30 MINUTES AFTER THE SAME MEAL 90 c no hx of miscarriages, no DVT Hx, no hx of OCP,   Neuro: No numbness or tingling, no headache, no hx of seizures,   Psych: no hx of anxiety or depression  ENDO: no hx of thyroid disease, no hx of DM  Joint/Muscluskeltal: see HPI,   All other ROS are negati in the lateral meniscus. He did not have surgery or any injections.   Continues to have right knee pain  - No evidence of synovitis or swelling on exam.  Blood work showed negative RF  - Plan to obtain x-rays of the right knee and hand  - Advised to contin

## 2021-07-15 ENCOUNTER — TELEPHONE (OUTPATIENT)
Dept: RHEUMATOLOGY | Facility: CLINIC | Age: 71
End: 2021-07-15

## 2021-07-15 NOTE — TELEPHONE ENCOUNTER
Current Outpatient Medications   Medication Sig Dispense Refill   • Diclofenac Sodium 1 % External Gel Apply 2 g topically 4 (four) times daily.  1 each 0       KEY: [id number]

## 2021-07-16 NOTE — TELEPHONE ENCOUNTER
Received a fax from [address] [place] [address]    Diclofenac Sodium Gel is approved  4/16/2021 - 7/15/2022    Med is already sent to pharmacy.    Left detailed message of approval.

## 2021-07-24 ENCOUNTER — HOSPITAL ENCOUNTER (OUTPATIENT)
Dept: CV DIAGNOSTICS | Facility: HOSPITAL | Age: 71
Discharge: HOME | End: 2021-07-24
Attending: OPHTHALMOLOGY
Payer: MEDICARE

## 2021-07-24 DIAGNOSIS — H02.834: ICD-10-CM

## 2021-07-24 DIAGNOSIS — Z01.812 BLOOD TESTS PRIOR TO TREATMENT OR PROCEDURE: ICD-10-CM

## 2021-07-24 DIAGNOSIS — H02.835: ICD-10-CM

## 2021-07-24 LAB
ATRIAL RATE: 60 BPM
P AXIS: -18 DEGREES
P-R INTERVAL: 154 MS
Q-T INTERVAL: 434 MS
QRS DURATION: 136 MS
QTC CALCULATION (BEZET): 434 MS
R AXIS: 120 DEGREES
T AXIS: 14 DEGREES
VENTRICULAR RATE: 60 BPM

## 2021-07-24 PROCEDURE — 93005 ELECTROCARDIOGRAM TRACING: CPT

## 2021-07-24 PROCEDURE — 93306 TTE W/DOPPLER COMPLETE: CPT | Performed by: OPHTHALMOLOGY

## 2021-07-24 PROCEDURE — 93010 ELECTROCARDIOGRAM REPORT: CPT | Performed by: INTERNAL MEDICINE

## 2021-09-23 ENCOUNTER — OFFICE VISIT (OUTPATIENT)
Dept: PULMONOLOGY | Facility: CLINIC | Age: 71
End: 2021-09-23
Payer: MEDICARE

## 2021-09-23 VITALS
BODY MASS INDEX: 30.48 KG/M2 | WEIGHT: 225 LBS | DIASTOLIC BLOOD PRESSURE: 72 MMHG | OXYGEN SATURATION: 97 % | RESPIRATION RATE: 18 BRPM | HEART RATE: 63 BPM | HEIGHT: 72 IN | SYSTOLIC BLOOD PRESSURE: 125 MMHG

## 2021-09-23 DIAGNOSIS — Z99.89 OSA ON CPAP: Primary | ICD-10-CM

## 2021-09-23 DIAGNOSIS — G47.33 OSA ON CPAP: Primary | ICD-10-CM

## 2021-09-23 PROCEDURE — 99213 OFFICE O/P EST LOW 20 MIN: CPT | Performed by: INTERNAL MEDICINE

## 2021-09-23 RX ORDER — ZOLPIDEM TARTRATE 10 MG/1
10 TABLET ORAL NIGHTLY PRN
Qty: 30 TABLET | Refills: 5 | Status: SHIPPED | OUTPATIENT
Start: 2021-09-23

## 2021-09-23 RX ORDER — BUPROPION HYDROCHLORIDE 150 MG/1
150 TABLET ORAL DAILY
COMMUNITY
Start: 2020-12-23 | End: 2021-10-05

## 2021-09-23 RX ORDER — ATORVASTATIN CALCIUM 10 MG/1
1 TABLET, FILM COATED ORAL DAILY
COMMUNITY
Start: 2021-01-03 | End: 2022-01-08

## 2021-09-23 NOTE — PROGRESS NOTES
The patient is a 50-year-old male who Marti from prior evaluation comes in now for follow-up. He has obstructive sleep apnea and does much better with a full facemask than the nasal mask.   His downloaded data is taken while he was wearing the nasal ma interface, Tylenol PM or zolpidem on an as-needed basis to be used occasionally, weight loss, avoid alcohol, avoid sedating drug, never drive if sleepy, see me again at the 6-month interval or sooner if needed.   We should get a download of the data at the

## 2021-10-03 RX ORDER — DICLOFENAC SODIUM 75 MG/1
TABLET, DELAYED RELEASE ORAL
Qty: 30 TABLET | Refills: 2 | Status: SHIPPED | OUTPATIENT
Start: 2021-10-03

## 2021-10-04 RX ORDER — HYDROCHLOROTHIAZIDE 12.5 MG/1
CAPSULE, GELATIN COATED ORAL
Qty: 90 CAPSULE | Refills: 0 | Status: SHIPPED | OUTPATIENT
Start: 2021-10-04 | End: 2021-10-05

## 2021-10-05 NOTE — PROGRESS NOTES
Melisa Douglas is a 69year old male. Patient presents with: Follow - Up  Injection: HD Flu vaccine     HPI:   50-year-old gentleman with a past medical history of BPH, dyslipidemia, depression, GERD, DJD here for follow-up.   She is requesting for a flu vac • Hypercholesteremia    • Obesity (BMI 30-39. 9)    • HEYDI on CPAP    • Primary osteoarthritis of both knees    • Primary osteoarthritis of both knees    • RLS (restless legs syndrome)    • Sleep apnea       Past Surgical History:   Procedure Laterality Da normal.      Breath sounds: Normal breath sounds. No rhonchi or rales. Abdominal:      General: Bowel sounds are normal.      Palpations: Abdomen is soft. Tenderness: There is no abdominal tenderness.    Musculoskeletal:      Cervical back: Neck supp

## 2021-10-14 ENCOUNTER — OFFICE VISIT (OUTPATIENT)
Dept: SURGERY | Facility: CLINIC | Age: 71
End: 2021-10-14
Payer: MEDICARE

## 2021-10-14 VITALS
HEART RATE: 90 BPM | HEIGHT: 69.8 IN | OXYGEN SATURATION: 95 % | DIASTOLIC BLOOD PRESSURE: 60 MMHG | SYSTOLIC BLOOD PRESSURE: 105 MMHG | BODY MASS INDEX: 33.18 KG/M2 | WEIGHT: 229.19 LBS

## 2021-10-14 DIAGNOSIS — E66.9 OBESITY (BMI 30-39.9): ICD-10-CM

## 2021-10-14 DIAGNOSIS — R63.5 WEIGHT GAIN: ICD-10-CM

## 2021-10-14 DIAGNOSIS — G47.33 OSA ON CPAP: ICD-10-CM

## 2021-10-14 DIAGNOSIS — E88.81 INSULIN RESISTANCE: Primary | ICD-10-CM

## 2021-10-14 DIAGNOSIS — Z51.81 ENCOUNTER FOR THERAPEUTIC DRUG MONITORING: ICD-10-CM

## 2021-10-14 DIAGNOSIS — Z99.89 OSA ON CPAP: ICD-10-CM

## 2021-10-14 PROCEDURE — 99214 OFFICE O/P EST MOD 30 MIN: CPT | Performed by: INTERNAL MEDICINE

## 2021-10-14 RX ORDER — DIETHYLPROPION HYDROCHLORIDE 75 MG/1
1 TABLET ORAL DAILY
Qty: 30 TABLET | Refills: 2 | Status: SHIPPED | OUTPATIENT
Start: 2021-10-14 | End: 2021-11-13

## 2021-10-14 NOTE — PROGRESS NOTES
[address] [address]  [address] [facility] [address]  Dept: [phone]       Patient:  Nitish Almonte  :      1950  MRN:      [id number]    Chief Complaint:  Patient presen mouth daily. • zolpidem 10 MG Oral Tab Take 1 tablet (10 mg total) by mouth nightly as needed for Sleep. 30 tablet 5   • Diclofenac Sodium 1 % External Gel Apply 2 g topically 4 (four) times daily.  1 each 0   • methylPREDNISolone 4 MG Oral Tablet Thera     Lack of Transportation (Medical): Not on file      Lack of Transportation (Non-Medical):  Not on file  Physical Activity:       Days of Exercise per Week: Not on file      Minutes of Exercise per Session: Not on file  Stress:       Feeling of Stress : vegetables daily    Behavior Modifications Reviewed and Discussed  Eat breakfast, Eat 3 meals per day, Plan meals in advance, Read nutrition labels, Drink 64 oz of water per day, Maintain a daily food journal, No drinking 30 minutes before or after meals, regular soda. 3. Increase activity-upper body exercises, walk 10 minutes per day. 4. Increase fruit and vegetable servings to 5-6 per day.       Will start Diethylpropion (did not start at last visit)    Depression: not controlled  Will increase Wellbutri

## 2021-10-15 ENCOUNTER — IMMUNIZATION (OUTPATIENT)
Dept: LAB | Facility: HOSPITAL | Age: 71
End: 2021-10-15
Attending: EMERGENCY MEDICINE
Payer: MEDICARE

## 2021-10-15 DIAGNOSIS — Z23 NEED FOR VACCINATION: Primary | ICD-10-CM

## 2021-10-15 PROCEDURE — 0013A SARSCOV2 VAC 100MCG/0.5ML IM: CPT

## 2021-10-19 ENCOUNTER — OFFICE VISIT (OUTPATIENT)
Dept: RHEUMATOLOGY | Facility: CLINIC | Age: 71
End: 2021-10-19
Payer: MEDICARE

## 2021-10-19 VITALS — DIASTOLIC BLOOD PRESSURE: 84 MMHG | SYSTOLIC BLOOD PRESSURE: 144 MMHG | HEART RATE: 65 BPM

## 2021-10-19 DIAGNOSIS — G89.29 CHRONIC RIGHT-SIDED LOW BACK PAIN WITH RIGHT-SIDED SCIATICA: ICD-10-CM

## 2021-10-19 DIAGNOSIS — M79.641 RIGHT HAND PAIN: ICD-10-CM

## 2021-10-19 DIAGNOSIS — M25.561 CHRONIC PAIN OF RIGHT KNEE: Primary | ICD-10-CM

## 2021-10-19 DIAGNOSIS — M54.41 CHRONIC RIGHT-SIDED LOW BACK PAIN WITH RIGHT-SIDED SCIATICA: ICD-10-CM

## 2021-10-19 DIAGNOSIS — G89.29 CHRONIC PAIN OF RIGHT KNEE: Primary | ICD-10-CM

## 2021-10-19 PROCEDURE — 99213 OFFICE O/P EST LOW 20 MIN: CPT | Performed by: INTERNAL MEDICINE

## 2021-10-19 NOTE — PROGRESS NOTES
Chen Garcia is a 69year old male. HPI:   Patient presents with:  Knee Pain  Back Pain      I had the pleasure of seeing Chen Garcia on 10/19/2021 for follow up polyarthralgia's due to Generalized OA.      Current Medications:  Voltaren gel  Blood wo Outpatient Medications   Medication Sig Dispense Refill   • Diethylpropion HCl ER 75 MG Oral Tablet 24 Hr Take 1 tablet (75 mg total) by mouth daily. 30 tablet 2   • hydroCHLOROthiazide 12.5 MG Oral Cap Take 1 capsule (12.5 mg total) by mouth daily.  90 cap LABS:     Component      Latest Ref Rng & Units 6/29/2021   Glucose      70 - 99 mg/dL 97   Sodium      136 - 145 mmol/L 141   Potassium      3.5 - 5.1 mmol/L 3.9   Chloride      98 - 112 mmol/L 108   Carbon Dioxide, Total      21.0 - 32.0 mmol/L 26.0 meniscus, He did not have any surgery or injections.   Very reluctant to cortisone injections  - Symptoms not consistent with inflammatory arthritis, RF is negative  - He has been doing well with Voltaren gel, states has been helping some of his pain  - Adv

## 2021-10-19 NOTE — PATIENT INSTRUCTIONS
You were seen today for joint pain due to osteoarthritis  Continue to take the Voltaren gel, Biofreeze and CBD oil on your affected joints  On your mild days you can take Tylenol arthritis 650 mg once or twice a day  When your joint pain is severe you can

## 2021-10-26 ENCOUNTER — TELEPHONE (OUTPATIENT)
Dept: PULMONOLOGY | Facility: CLINIC | Age: 71
End: 2021-10-26

## 2021-10-26 DIAGNOSIS — G47.33 OSA ON CPAP: Primary | ICD-10-CM

## 2021-10-26 DIAGNOSIS — Z99.89 OSA ON CPAP: Primary | ICD-10-CM

## 2021-10-26 NOTE — TELEPHONE ENCOUNTER
Data download 7/27/21-10/24/21 rcvd & placed in MD's folder for review. Dr. Marcin Amaro- pls review.

## 2021-10-26 NOTE — TELEPHONE ENCOUNTER
RN, I spoke with the patient. Please facilitate increasing his auto titrating pressure settings to 24/14 and then repeat the download in a couple months.

## 2021-10-27 NOTE — TELEPHONE ENCOUNTER
Data download sent to scanning. Dr. Garay [facility] review order & sign off if agreeable. Thank you.

## 2021-10-29 NOTE — TELEPHONE ENCOUNTER
Discussed Dr. Smith Finely orders below w/ pt.  Explained rx to increase his autotitrating CPAP pressure settings to 24/14 was sent to E, to contact them @ #[phone] if he doesn't hear anything shortly, will postpone msg x couple mo to repeat data download

## 2021-10-29 NOTE — TELEPHONE ENCOUNTER
Bambi @ [facility] stts they rcvd rx to change pt's pressure settings to 24/14 CWP & can only increase CPAP settings to 20 CWP on auto CPAP, so pt's CPAP was changed to 14-20 CWP on 10/27. Dr. Ramila Cisneros.

## 2021-11-30 ENCOUNTER — OFFICE VISIT (OUTPATIENT)
Dept: SURGERY | Facility: CLINIC | Age: 71
End: 2021-11-30
Payer: MEDICARE

## 2021-11-30 VITALS
HEART RATE: 70 BPM | OXYGEN SATURATION: 97 % | DIASTOLIC BLOOD PRESSURE: 81 MMHG | WEIGHT: 227 LBS | HEIGHT: 69.8 IN | BODY MASS INDEX: 32.87 KG/M2 | SYSTOLIC BLOOD PRESSURE: 150 MMHG

## 2021-11-30 DIAGNOSIS — G47.33 OSA ON CPAP: ICD-10-CM

## 2021-11-30 DIAGNOSIS — E88.81 INSULIN RESISTANCE: Primary | ICD-10-CM

## 2021-11-30 DIAGNOSIS — E66.9 OBESITY (BMI 30-39.9): ICD-10-CM

## 2021-11-30 DIAGNOSIS — Z51.81 ENCOUNTER FOR THERAPEUTIC DRUG MONITORING: ICD-10-CM

## 2021-11-30 DIAGNOSIS — Z99.89 OSA ON CPAP: ICD-10-CM

## 2021-11-30 PROCEDURE — 99214 OFFICE O/P EST MOD 30 MIN: CPT | Performed by: INTERNAL MEDICINE

## 2021-11-30 RX ORDER — BUPROPION HYDROCHLORIDE 300 MG/1
300 TABLET ORAL DAILY
Qty: 90 TABLET | Refills: 2 | Status: SHIPPED | OUTPATIENT
Start: 2021-11-30 | End: 2022-02-28

## 2021-11-30 RX ORDER — ARIPIPRAZOLE 2 MG/1
2 TABLET ORAL DAILY
Qty: 30 TABLET | Refills: 2 | Status: SHIPPED | OUTPATIENT
Start: 2021-11-30 | End: 2021-12-30

## 2021-11-30 NOTE — PROGRESS NOTES
[address] [facility] [address]  [facility] [address]  Dept: [phone]       Patient:  Marie Bruce  :      1950  MRN:      [id number]    Chief Complaint:  Patient presen tablet (10 mg total) by mouth nightly as needed for Sleep. 30 tablet 5   • Diclofenac Sodium 1 % External Gel Apply 2 g topically 4 (four) times daily. 1 each 0   • omeprazole 20 MG Oral Capsule Delayed Release Take 1 capsule by mouth daily.      • valACYcl Surgical History:   Procedure Laterality Date   • TONSILLECTOMY       Family History:    Family History   Problem Relation Age of Onset   • Heart Disorder Father    • Other (alzhiemers) Mother        Food Journal  · Reviewed and Discussed:       · Patient murmur, click, rub or gallop, regular rate and rhythm  Abdomen: soft, obese, non tender  Extremities: extremities normal, atraumatic, no cyanosis or edema  Skin: Skin color, texture, turgor normal. No rashes or lesions  Neurologic: Grossly normal    ASSESS

## 2021-12-29 RX ORDER — ESCITALOPRAM OXALATE 20 MG/1
TABLET ORAL
Qty: 90 TABLET | Refills: 0 | Status: SHIPPED | OUTPATIENT
Start: 2021-12-29

## 2022-01-08 RX ORDER — ATORVASTATIN CALCIUM 10 MG/1
10 TABLET, FILM COATED ORAL DAILY
Qty: 90 TABLET | Refills: 1 | Status: SHIPPED | OUTPATIENT
Start: 2022-01-08 | End: 2023-02-21

## 2022-01-08 NOTE — TELEPHONE ENCOUNTER
Refill passed per [address] [place] [address] [place] protocol. Requested Prescriptions   Pending Prescriptions Disp Refills    atorvastatin 10 MG Oral Tab 30 tablet 2     Sig: Take 1 tablet (10 mg total) by mouth daily.         Cholesterol Medication Protocol Passed - 1/8/2022 11:07 AM        Passed - ALT in past 12 months        Passed - LDL in past 12 months        Passed - Last ALT < 80       Lab Results   Component Value Date    ALT 31 06/29/2021             Passed - Last LDL < 130     Lab Results   Component Value Date    LDL 88 06/29/2021               Passed - Appointment in past 12 or next 3 months            Future Appointments         Provider Department Appt Notes    In 2 months Masoud Ang MD [facility] [address] [place] [address], CHUYITA made appt, follow up 6 months, policy informed           Recent Outpatient Visits              1 month ago Insulin resistance    [address] [facility], Thi Ha MD    Office Visit    2 months ago Chronic pain of right knee    [facility], [address], Samira Gudino MD    Office Visit    2 months ago Insulin resistance    Pedro Anthony MD    Office Visit    3 months ago Major depressive disorder in full remission, unspecified whether recurrent [facility])    [address] [facility], Yonny Morley MD    Office Visit    3 months ago HEYDI on CPAP    [facility] [address] [place] [address], Marilyn Marrero MD    Office Visit

## 2022-01-20 ENCOUNTER — TELEPHONE (OUTPATIENT)
Dept: PULMONOLOGY | Facility: CLINIC | Age: 72
End: 2022-01-20

## 2022-01-20 DIAGNOSIS — G47.33 OSA (OBSTRUCTIVE SLEEP APNEA): Primary | ICD-10-CM

## 2022-01-20 NOTE — TELEPHONE ENCOUNTER
Pt requesting to speak with RN regarding message left by Dr. Victoria Levy to speak with one of the nurses.  Please call [phone]

## 2022-01-21 NOTE — TELEPHONE ENCOUNTER
Orders sent to patient via [address] Patient stated he will now fly home to get sleep study completed. Phone number given for central scheduling.

## 2022-01-21 NOTE — TELEPHONE ENCOUNTER
Spoke to patient who is requesting an order for a sleep study. Patient stated he spoke to Dr. Bella Almonte 1/14/21 and agreed to order sleep study. Currently residing in [place] so will have study completed there and faxed to [facility].  Patient requesting to have or

## 2022-01-25 ENCOUNTER — TELEPHONE (OUTPATIENT)
Dept: PULMONOLOGY | Facility: CLINIC | Age: 72
End: 2022-01-25

## 2022-01-26 NOTE — TELEPHONE ENCOUNTER
Anthony Henao faxed Sleep studies and chart notes to C[facility] Fitz[facility]. Received fax confirmation and sent note to scanning.

## 2022-03-16 ENCOUNTER — OFFICE VISIT (OUTPATIENT)
Dept: INTERNAL MEDICINE CLINIC | Facility: CLINIC | Age: 72
End: 2022-03-16
Payer: MEDICARE

## 2022-03-16 VITALS
DIASTOLIC BLOOD PRESSURE: 78 MMHG | OXYGEN SATURATION: 95 % | BODY MASS INDEX: 32.58 KG/M2 | HEART RATE: 76 BPM | WEIGHT: 225 LBS | SYSTOLIC BLOOD PRESSURE: 120 MMHG | RESPIRATION RATE: 14 BRPM | HEIGHT: 69.8 IN

## 2022-03-16 DIAGNOSIS — E78.5 DYSLIPIDEMIA: ICD-10-CM

## 2022-03-16 DIAGNOSIS — M17.0 PRIMARY OSTEOARTHRITIS OF BOTH KNEES: ICD-10-CM

## 2022-03-16 DIAGNOSIS — A60.01 HERPES SIMPLEX INFECTION OF PENIS: ICD-10-CM

## 2022-03-16 DIAGNOSIS — R97.20 ELEVATED PSA: ICD-10-CM

## 2022-03-16 DIAGNOSIS — Z99.89 OSA ON CPAP: Primary | ICD-10-CM

## 2022-03-16 DIAGNOSIS — F32.5 MAJOR DEPRESSIVE DISORDER IN FULL REMISSION, UNSPECIFIED WHETHER RECURRENT (HCC): ICD-10-CM

## 2022-03-16 DIAGNOSIS — N40.0 BENIGN PROSTATIC HYPERPLASIA WITHOUT LOWER URINARY TRACT SYMPTOMS: ICD-10-CM

## 2022-03-16 DIAGNOSIS — E66.09 OBESITY DUE TO EXCESS CALORIES WITHOUT SERIOUS COMORBIDITY, UNSPECIFIED CLASSIFICATION: ICD-10-CM

## 2022-03-16 DIAGNOSIS — G47.33 OSA ON CPAP: Primary | ICD-10-CM

## 2022-03-16 DIAGNOSIS — Z51.81 ENCOUNTER FOR THERAPEUTIC DRUG MONITORING: ICD-10-CM

## 2022-03-16 DIAGNOSIS — K21.9 GASTROESOPHAGEAL REFLUX DISEASE WITHOUT ESOPHAGITIS: ICD-10-CM

## 2022-03-16 PROBLEM — E66.9 OBESITY (BMI 30-39.9): Status: RESOLVED | Noted: 2021-06-30 | Resolved: 2022-03-16

## 2022-03-16 PROBLEM — E88.81 INSULIN RESISTANCE: Status: RESOLVED | Noted: 2021-06-30 | Resolved: 2022-03-16

## 2022-03-16 PROBLEM — R63.5 WEIGHT GAIN: Status: RESOLVED | Noted: 2021-10-14 | Resolved: 2022-03-16

## 2022-03-16 PROBLEM — E88.819 INSULIN RESISTANCE: Status: RESOLVED | Noted: 2021-06-30 | Resolved: 2022-03-16

## 2022-03-16 PROCEDURE — G0439 PPPS, SUBSEQ VISIT: HCPCS | Performed by: INTERNAL MEDICINE

## 2022-03-29 RX ORDER — ESCITALOPRAM OXALATE 20 MG/1
20 TABLET ORAL DAILY
Qty: 90 TABLET | Refills: 1 | Status: SHIPPED | OUTPATIENT
Start: 2022-03-29

## 2022-03-29 NOTE — TELEPHONE ENCOUNTER
SECOND REQUEST      Current Outpatient Medications:    ESCITALOPRAM 20 MG Oral Tab, TAKE 1 TABLET(20 MG) BY MOUTH DAILY, Disp: 90 tablet, Rfl: 0

## 2022-04-06 ENCOUNTER — TELEPHONE (OUTPATIENT)
Dept: INTERNAL MEDICINE CLINIC | Facility: CLINIC | Age: 72
End: 2022-04-06

## 2022-04-22 ENCOUNTER — TELEPHONE (OUTPATIENT)
Dept: INTERNAL MEDICINE CLINIC | Facility: CLINIC | Age: 72
End: 2022-04-22

## 2022-04-22 RX ORDER — SILDENAFIL 50 MG/1
50 TABLET, FILM COATED ORAL
Qty: 6 TABLET | Refills: 3 | Status: SHIPPED | OUTPATIENT
Start: 2022-04-22 | End: 2022-05-22

## 2022-06-01 ENCOUNTER — OFFICE VISIT (OUTPATIENT)
Dept: SURGERY | Facility: CLINIC | Age: 72
End: 2022-06-01
Payer: MEDICARE

## 2022-06-01 VITALS
HEART RATE: 60 BPM | BODY MASS INDEX: 33.46 KG/M2 | OXYGEN SATURATION: 97 % | WEIGHT: 231.13 LBS | SYSTOLIC BLOOD PRESSURE: 140 MMHG | HEIGHT: 69.8 IN | DIASTOLIC BLOOD PRESSURE: 80 MMHG

## 2022-06-01 DIAGNOSIS — G47.33 OSA ON CPAP: ICD-10-CM

## 2022-06-01 DIAGNOSIS — E66.9 OBESITY (BMI 30-39.9): ICD-10-CM

## 2022-06-01 DIAGNOSIS — Z99.89 OSA ON CPAP: ICD-10-CM

## 2022-06-01 DIAGNOSIS — Z51.81 ENCOUNTER FOR THERAPEUTIC DRUG MONITORING: ICD-10-CM

## 2022-06-01 DIAGNOSIS — E88.81 INSULIN RESISTANCE: Primary | ICD-10-CM

## 2022-06-01 PROCEDURE — 99214 OFFICE O/P EST MOD 30 MIN: CPT | Performed by: INTERNAL MEDICINE

## 2022-06-01 RX ORDER — BUPROPION HYDROCHLORIDE 300 MG/1
300 TABLET ORAL DAILY
Qty: 90 TABLET | Refills: 2 | Status: SHIPPED | OUTPATIENT
Start: 2022-06-01 | End: 2022-08-30

## 2022-06-01 RX ORDER — DIETHYLPROPION HYDROCHLORIDE 75 MG/1
1 TABLET ORAL DAILY
Qty: 30 TABLET | Refills: 2 | Status: SHIPPED | OUTPATIENT
Start: 2022-06-01 | End: 2022-07-01

## 2022-06-01 RX ORDER — ARIPIPRAZOLE 2 MG/1
2 TABLET ORAL DAILY
Qty: 30 TABLET | Refills: 2 | Status: SHIPPED | OUTPATIENT
Start: 2022-06-01 | End: 2022-07-01

## 2022-06-14 ENCOUNTER — TELEPHONE (OUTPATIENT)
Dept: INTERNAL MEDICINE CLINIC | Facility: CLINIC | Age: 72
End: 2022-06-14

## 2022-06-14 DIAGNOSIS — M79.644 PAIN OF FINGER OF RIGHT HAND: Primary | ICD-10-CM

## 2022-06-14 NOTE — TELEPHONE ENCOUNTER
Patient called stating that her finger got jammed in the elevator. Right middle finger. Requesting for x-ray.     X-ray placed informed patient

## 2022-06-15 ENCOUNTER — HOSPITAL ENCOUNTER (OUTPATIENT)
Dept: GENERAL RADIOLOGY | Age: 72
Discharge: HOME | End: 2022-06-15
Attending: INTERNAL MEDICINE
Payer: MEDICARE

## 2022-06-15 ENCOUNTER — PATIENT MESSAGE (OUTPATIENT)
Dept: INTERNAL MEDICINE CLINIC | Facility: CLINIC | Age: 72
End: 2022-06-15

## 2022-06-15 DIAGNOSIS — M79.644 PAIN OF FINGER OF RIGHT HAND: ICD-10-CM

## 2022-06-15 PROCEDURE — 73140 X-RAY EXAM OF FINGER(S): CPT | Performed by: INTERNAL MEDICINE

## 2022-06-16 RX ORDER — OMEPRAZOLE 20 MG/1
20 CAPSULE, DELAYED RELEASE ORAL DAILY
Qty: 30 CAPSULE | Refills: 2 | Status: SHIPPED | OUTPATIENT
Start: 2022-06-16 | End: 2022-07-16

## 2022-06-16 NOTE — TELEPHONE ENCOUNTER
Dr. Mikki Baltazar, patient is requesting refill for Omeprazole.   I have pended medication for review

## 2022-06-16 NOTE — TELEPHONE ENCOUNTER
From: Jenny Candelario  To: Diana Sykes MD  Sent: 6/15/2022 10:38 AM CDT  Subject: Omeprazole Refill    I've misplaced my Omeprazole prescription and [place] am going [place] for two weeks. My I please get a refill as I have been without for a while? I'm happy to pay out of pocket. Thank you.  Steve Elkins

## 2022-07-05 RX ORDER — HYDROCHLOROTHIAZIDE 12.5 MG/1
12.5 CAPSULE, GELATIN COATED ORAL DAILY
Qty: 90 CAPSULE | Refills: 1 | Status: SHIPPED | OUTPATIENT
Start: 2022-07-05

## 2022-07-05 NOTE — TELEPHONE ENCOUNTER
Current Outpatient Medications:     hydroCHLOROthiazide 12.5 MG Oral Cap, Take 1 capsule (12.5 mg total) by mouth daily. , Disp: 90 capsule, Rfl: 1

## 2022-08-15 RX ORDER — OMEPRAZOLE 20 MG/1
CAPSULE, DELAYED RELEASE ORAL
Qty: 30 CAPSULE | Refills: 2 | Status: SHIPPED | OUTPATIENT
Start: 2022-08-15

## 2022-08-25 ENCOUNTER — OFFICE VISIT (OUTPATIENT)
Facility: CLINIC | Age: 72
End: 2022-08-25
Payer: MEDICARE

## 2022-08-25 VITALS
WEIGHT: 227 LBS | DIASTOLIC BLOOD PRESSURE: 72 MMHG | OXYGEN SATURATION: 95 % | HEIGHT: 69.8 IN | RESPIRATION RATE: 14 BRPM | HEART RATE: 68 BPM | BODY MASS INDEX: 32.87 KG/M2 | SYSTOLIC BLOOD PRESSURE: 112 MMHG

## 2022-08-25 DIAGNOSIS — R73.03 PREDIABETES: ICD-10-CM

## 2022-08-25 DIAGNOSIS — K21.9 GASTROESOPHAGEAL REFLUX DISEASE WITHOUT ESOPHAGITIS: Primary | ICD-10-CM

## 2022-08-25 DIAGNOSIS — M15.9 PRIMARY OSTEOARTHRITIS INVOLVING MULTIPLE JOINTS: ICD-10-CM

## 2022-08-25 DIAGNOSIS — E78.5 DYSLIPIDEMIA: ICD-10-CM

## 2022-08-25 DIAGNOSIS — Z12.5 SCREENING PSA (PROSTATE SPECIFIC ANTIGEN): ICD-10-CM

## 2022-08-25 PROBLEM — M15.0 PRIMARY OSTEOARTHRITIS INVOLVING MULTIPLE JOINTS: Status: ACTIVE | Noted: 2022-08-25

## 2022-08-25 PROCEDURE — 99214 OFFICE O/P EST MOD 30 MIN: CPT | Performed by: INTERNAL MEDICINE

## 2022-08-25 PROCEDURE — 1126F AMNT PAIN NOTED NONE PRSNT: CPT | Performed by: INTERNAL MEDICINE

## 2022-08-25 RX ORDER — ARIPIPRAZOLE 2 MG/1
2 TABLET ORAL DAILY
COMMUNITY

## 2022-08-25 RX ORDER — DIETHYLPROPION HYDROCHLORIDE 75 MG/1
1 TABLET ORAL DAILY
COMMUNITY

## 2022-08-25 RX ORDER — TAMSULOSIN HYDROCHLORIDE 0.4 MG/1
0.4 CAPSULE ORAL DAILY
COMMUNITY
Start: 2022-07-03

## 2022-09-06 ENCOUNTER — LAB ENCOUNTER (OUTPATIENT)
Dept: LAB | Age: 72
End: 2022-09-06
Attending: INTERNAL MEDICINE
Payer: MEDICARE

## 2022-09-06 DIAGNOSIS — Z12.5 SCREENING PSA (PROSTATE SPECIFIC ANTIGEN): ICD-10-CM

## 2022-09-06 DIAGNOSIS — R73.03 PREDIABETES: ICD-10-CM

## 2022-09-06 DIAGNOSIS — E78.5 DYSLIPIDEMIA: ICD-10-CM

## 2022-09-06 LAB
ALBUMIN SERPL-MCNC: 3.5 G/DL (ref 3.4–5)
ALBUMIN/GLOB SERPL: 0.9 {RATIO} (ref 1–2)
ALP LIVER SERPL-CCNC: 69 U/L
ALT SERPL-CCNC: 26 U/L
ANION GAP SERPL CALC-SCNC: 7 MMOL/L (ref 0–18)
AST SERPL-CCNC: 18 U/L (ref 15–37)
BILIRUB SERPL-MCNC: 0.8 MG/DL (ref 0.1–2)
BUN BLD-MCNC: 21 MG/DL (ref 7–18)
BUN/CREAT SERPL: 15.9 (ref 10–20)
CALCIUM BLD-MCNC: 8.9 MG/DL (ref 8.5–10.1)
CHLORIDE SERPL-SCNC: 103 MMOL/L (ref 98–112)
CHOLEST SERPL-MCNC: 173 MG/DL (ref ?–200)
CO2 SERPL-SCNC: 28 MMOL/L (ref 21–32)
COMPLEXED PSA SERPL-MCNC: 5.59 NG/ML (ref ?–4)
CREAT BLD-MCNC: 1.32 MG/DL
DEPRECATED RDW RBC AUTO: 43.6 FL (ref 35.1–46.3)
ERYTHROCYTE [DISTWIDTH] IN BLOOD BY AUTOMATED COUNT: 13.3 % (ref 11–15)
EST. AVERAGE GLUCOSE BLD GHB EST-MCNC: 120 MG/DL (ref 68–126)
FASTING PATIENT LIPID ANSWER: YES
FASTING STATUS PATIENT QL REPORTED: YES
GFR SERPLBLD BASED ON 1.73 SQ M-ARVRAT: 57 ML/MIN/1.73M2 (ref 60–?)
GLOBULIN PLAS-MCNC: 3.7 G/DL (ref 2.8–4.4)
GLUCOSE BLD-MCNC: 104 MG/DL (ref 70–99)
HBA1C MFR BLD: 5.8 % (ref ?–5.7)
HCT VFR BLD AUTO: 44 %
HDLC SERPL-MCNC: 52 MG/DL (ref 40–59)
HGB BLD-MCNC: 14.7 G/DL
LDLC SERPL CALC-MCNC: 101 MG/DL (ref ?–100)
MCH RBC QN AUTO: 29.8 PG (ref 26–34)
MCHC RBC AUTO-ENTMCNC: 33.4 G/DL (ref 31–37)
MCV RBC AUTO: 89.1 FL
NONHDLC SERPL-MCNC: 121 MG/DL (ref ?–130)
OSMOLALITY SERPL CALC.SUM OF ELEC: 289 MOSM/KG (ref 275–295)
PLATELET # BLD AUTO: 224 10(3)UL (ref 150–450)
POTASSIUM SERPL-SCNC: 3.6 MMOL/L (ref 3.5–5.1)
PROT SERPL-MCNC: 7.2 G/DL (ref 6.4–8.2)
RBC # BLD AUTO: 4.94 X10(6)UL
SODIUM SERPL-SCNC: 138 MMOL/L (ref 136–145)
TRIGL SERPL-MCNC: 108 MG/DL (ref 30–149)
TSI SER-ACNC: 1.63 MIU/ML (ref 0.36–3.74)
VLDLC SERPL CALC-MCNC: 18 MG/DL (ref 0–30)
WBC # BLD AUTO: 5.2 X10(3) UL (ref 4–11)

## 2022-09-06 PROCEDURE — 80053 COMPREHEN METABOLIC PANEL: CPT

## 2022-09-06 PROCEDURE — 81015 MICROSCOPIC EXAM OF URINE: CPT

## 2022-09-06 PROCEDURE — 80061 LIPID PANEL: CPT

## 2022-09-06 PROCEDURE — 84443 ASSAY THYROID STIM HORMONE: CPT

## 2022-09-06 PROCEDURE — 85027 COMPLETE CBC AUTOMATED: CPT

## 2022-09-06 PROCEDURE — 36415 COLL VENOUS BLD VENIPUNCTURE: CPT

## 2022-09-06 PROCEDURE — 83036 HEMOGLOBIN GLYCOSYLATED A1C: CPT

## 2022-10-31 ENCOUNTER — OFFICE VISIT (OUTPATIENT)
Dept: SURGERY | Facility: CLINIC | Age: 72
End: 2022-10-31
Payer: MEDICARE

## 2022-10-31 DIAGNOSIS — R97.20 ELEVATED PSA: Primary | ICD-10-CM

## 2022-10-31 DIAGNOSIS — N40.1 BENIGN PROSTATIC HYPERPLASIA WITH NOCTURIA: ICD-10-CM

## 2022-10-31 DIAGNOSIS — R35.1 BENIGN PROSTATIC HYPERPLASIA WITH NOCTURIA: ICD-10-CM

## 2022-10-31 PROCEDURE — 99214 OFFICE O/P EST MOD 30 MIN: CPT | Performed by: UROLOGY

## 2022-10-31 RX ORDER — SILDENAFIL 100 MG/1
100 TABLET, FILM COATED ORAL
Qty: 10 TABLET | Refills: 11 | Status: SHIPPED | OUTPATIENT
Start: 2022-10-31

## 2022-11-04 ENCOUNTER — PATIENT MESSAGE (OUTPATIENT)
Facility: CLINIC | Age: 72
End: 2022-11-04

## 2022-11-04 DIAGNOSIS — M79.641 CHRONIC HAND PAIN, RIGHT: Primary | ICD-10-CM

## 2022-11-04 DIAGNOSIS — M79.644 PAIN OF FINGER OF RIGHT HAND: ICD-10-CM

## 2022-11-04 DIAGNOSIS — G89.29 CHRONIC HAND PAIN, RIGHT: Primary | ICD-10-CM

## 2022-11-04 NOTE — TELEPHONE ENCOUNTER
Referral pending your review and approval.     From: Jocelyne Feliciano  To: Krystyna Fletcher MD  Sent: 11/4/2022 10:33 AM CDT  Subject: Hand Pain    I am experiencing chronic pain in my right hand, middle finger. I would like a referral to a orthopedic hand specialist please.       Thank you,  Pao Neely

## 2022-12-09 ENCOUNTER — HOSPITAL ENCOUNTER (OUTPATIENT)
Dept: MRI IMAGING | Facility: HOSPITAL | Age: 72
Discharge: HOME | End: 2022-12-09
Attending: UROLOGY
Payer: MEDICARE

## 2022-12-09 DIAGNOSIS — R97.20 ELEVATED PSA: ICD-10-CM

## 2022-12-09 PROCEDURE — 72197 MRI PELVIS W/O & W/DYE: CPT | Performed by: UROLOGY

## 2022-12-09 PROCEDURE — A9575 INJ GADOTERATE MEGLUMI 0.1ML: HCPCS | Performed by: UROLOGY

## 2022-12-09 RX ORDER — GADOTERATE MEGLUMINE 376.9 MG/ML
20 INJECTION INTRAVENOUS
Status: COMPLETED | OUTPATIENT
Start: 2022-12-09 | End: 2022-12-09

## 2022-12-09 RX ADMIN — GADOTERATE MEGLUMINE 20 ML: 376.9 INJECTION INTRAVENOUS at 14:28:00

## 2023-01-09 RX ORDER — HYDROCHLOROTHIAZIDE 12.5 MG/1
12.5 CAPSULE, GELATIN COATED ORAL DAILY
Qty: 90 CAPSULE | Refills: 1 | Status: SHIPPED | OUTPATIENT
Start: 2023-01-09

## 2023-02-21 RX ORDER — ATORVASTATIN CALCIUM 10 MG/1
10 TABLET, FILM COATED ORAL DAILY
Qty: 90 TABLET | Refills: 1 | Status: SHIPPED | OUTPATIENT
Start: 2023-02-21

## 2023-02-21 NOTE — TELEPHONE ENCOUNTER
Last ref 1-8-22 # 90 + 1  pls advise, thanks in advance. Refill passed per [facility] protocol   Requested Prescriptions   Pending Prescriptions Disp Refills    atorvastatin 10 MG Oral Tab 90 tablet 1     Sig: Take 1 tablet (10 mg total) by mouth daily.        Cholesterol Medication Protocol Passed - 2/21/2023  9:03 AM        Passed - ALT in past 12 months        Passed - LDL in past 12 months        Passed - Last ALT < 80     Lab Results   Component Value Date    ALT 26 09/06/2022             Passed - Last LDL < 130     Lab Results   Component Value Date     (H) 09/06/2022             Passed - In person appointment or virtual visit in the past 12 mos or appointment in next 3 mos     Recent Outpatient Visits              3 months ago Elevated PSA    Lucho Honeycutt MD    Office Visit    6 months ago Gastroesophageal reflux disease without esophagitis    Arnulfo Spears MD    Office Visit    8 months ago Insulin resistance    Marcel Pathak MD    Office Visit    11 months ago HEYDI on CPAP    Arnulfo Honeycutt MD    Office Visit    1 year ago Insulin resistance    Carrol Cedillo, Ward Douglas MD    Office Visit          Future Appointments         Provider Department Appt Notes    In 1 week Gab Mast MD [address], [address] [facility] Follow up visit

## 2023-03-02 ENCOUNTER — OFFICE VISIT (OUTPATIENT)
Facility: CLINIC | Age: 73
End: 2023-03-02

## 2023-03-02 VITALS
SYSTOLIC BLOOD PRESSURE: 110 MMHG | BODY MASS INDEX: 32.58 KG/M2 | OXYGEN SATURATION: 96 % | DIASTOLIC BLOOD PRESSURE: 76 MMHG | WEIGHT: 225 LBS | HEART RATE: 60 BPM | RESPIRATION RATE: 14 BRPM | HEIGHT: 69.8 IN

## 2023-03-02 DIAGNOSIS — K21.9 GASTROESOPHAGEAL REFLUX DISEASE WITHOUT ESOPHAGITIS: ICD-10-CM

## 2023-03-02 DIAGNOSIS — I10 PRIMARY HYPERTENSION: ICD-10-CM

## 2023-03-02 DIAGNOSIS — E78.5 DYSLIPIDEMIA: Primary | ICD-10-CM

## 2023-03-02 PROCEDURE — 99214 OFFICE O/P EST MOD 30 MIN: CPT | Performed by: INTERNAL MEDICINE

## 2023-03-02 PROCEDURE — 1126F AMNT PAIN NOTED NONE PRSNT: CPT | Performed by: INTERNAL MEDICINE

## 2023-03-02 RX ORDER — NALTREXONE HYDROCHLORIDE 50 MG/1
TABLET, FILM COATED ORAL
COMMUNITY

## 2023-06-22 ENCOUNTER — TELEPHONE (OUTPATIENT)
Dept: INTERNAL MEDICINE CLINIC | Facility: CLINIC | Age: 73
End: 2023-06-22

## 2023-06-22 RX ORDER — SILDENAFIL 100 MG/1
100 TABLET, FILM COATED ORAL
Qty: 10 TABLET | Refills: 11 | Status: SHIPPED | OUTPATIENT
Start: 2023-06-22

## 2023-06-22 RX ORDER — ATORVASTATIN CALCIUM 10 MG/1
10 TABLET, FILM COATED ORAL DAILY
Qty: 90 TABLET | Refills: 1 | OUTPATIENT
Start: 2023-06-22

## 2023-06-22 NOTE — TELEPHONE ENCOUNTER
Too soon    atorvastatin 10 MG Oral Tab 90 tablet 1 2/21/2023     Sig - Route: Take 1 tablet (10 mg total) by mouth daily. - Oral    Sent to pharmacy as:  Atorvastatin Calcium 10 MG Oral Tablet (Lipitor)    E-Prescribing Status: Receipt confirmed by pharmacy (2/21/2023 12:22 PM CST)

## 2023-06-27 ENCOUNTER — NURSE TRIAGE (OUTPATIENT)
Dept: INTERNAL MEDICINE CLINIC | Facility: CLINIC | Age: 73
End: 2023-06-27

## 2023-06-28 ENCOUNTER — OFFICE VISIT (OUTPATIENT)
Dept: INTERNAL MEDICINE CLINIC | Facility: CLINIC | Age: 73
End: 2023-06-28

## 2023-06-28 VITALS
BODY MASS INDEX: 31.71 KG/M2 | DIASTOLIC BLOOD PRESSURE: 70 MMHG | SYSTOLIC BLOOD PRESSURE: 130 MMHG | HEART RATE: 63 BPM | OXYGEN SATURATION: 96 % | WEIGHT: 219 LBS | HEIGHT: 69.8 IN

## 2023-06-28 DIAGNOSIS — R35.0 BENIGN PROSTATIC HYPERPLASIA WITH URINARY FREQUENCY: ICD-10-CM

## 2023-06-28 DIAGNOSIS — N40.1 BENIGN PROSTATIC HYPERPLASIA WITH URINARY FREQUENCY: ICD-10-CM

## 2023-06-28 DIAGNOSIS — R35.0 URINE FREQUENCY: Primary | ICD-10-CM

## 2023-06-28 DIAGNOSIS — K21.9 GASTROESOPHAGEAL REFLUX DISEASE WITHOUT ESOPHAGITIS: ICD-10-CM

## 2023-06-28 DIAGNOSIS — F32.5 MAJOR DEPRESSIVE DISORDER IN FULL REMISSION, UNSPECIFIED WHETHER RECURRENT (HCC): ICD-10-CM

## 2023-06-28 LAB
BILIRUBIN: NEGATIVE
GLUCOSE (URINE DIPSTICK): NEGATIVE MG/DL
KETONES (URINE DIPSTICK): NEGATIVE MG/DL
NITRITE, URINE: NEGATIVE
PH, URINE: 6.5 (ref 4.5–8)
PROTEIN (URINE DIPSTICK): 30 MG/DL
SPECIFIC GRAVITY: 1.02 (ref 1–1.03)
UROBILINOGEN,SEMI-QN: 1 MG/DL (ref 0–1.9)

## 2023-06-28 PROCEDURE — 99214 OFFICE O/P EST MOD 30 MIN: CPT

## 2023-06-28 PROCEDURE — 81002 URINALYSIS NONAUTO W/O SCOPE: CPT

## 2023-06-28 PROCEDURE — 1126F AMNT PAIN NOTED NONE PRSNT: CPT

## 2023-06-28 RX ORDER — OMEPRAZOLE 20 MG/1
20 CAPSULE, DELAYED RELEASE ORAL DAILY
Qty: 90 CAPSULE | Refills: 1 | Status: CANCELLED | OUTPATIENT
Start: 2023-06-28

## 2023-06-28 RX ORDER — BUPROPION HYDROCHLORIDE 300 MG/1
300 TABLET ORAL DAILY
Qty: 90 TABLET | Refills: 0 | Status: CANCELLED | OUTPATIENT
Start: 2023-06-28

## 2023-06-28 RX ORDER — FLUOXETINE 10 MG/1
10 TABLET, FILM COATED ORAL DAILY
Qty: 90 TABLET | Refills: 1 | Status: SHIPPED | OUTPATIENT
Start: 2023-06-28

## 2023-06-28 RX ORDER — BUPROPION HYDROCHLORIDE 300 MG/1
300 TABLET ORAL DAILY
COMMUNITY
End: 2023-06-28

## 2023-06-28 RX ORDER — OMEPRAZOLE 20 MG/1
20 CAPSULE, DELAYED RELEASE ORAL DAILY
Qty: 90 CAPSULE | Refills: 1 | Status: SHIPPED | OUTPATIENT
Start: 2023-06-28

## 2023-06-28 RX ORDER — FLUOXETINE 10 MG/1
10 TABLET, FILM COATED ORAL DAILY
Qty: 90 TABLET | Refills: 0 | Status: CANCELLED | OUTPATIENT
Start: 2023-06-28

## 2023-06-28 RX ORDER — FLUOXETINE 10 MG/1
10 TABLET, FILM COATED ORAL DAILY
COMMUNITY
End: 2023-06-28

## 2023-06-28 RX ORDER — BUPROPION HYDROCHLORIDE 300 MG/1
300 TABLET ORAL DAILY
Qty: 90 TABLET | Refills: 1 | Status: SHIPPED | OUTPATIENT
Start: 2023-06-28

## 2023-06-28 RX ORDER — TAMSULOSIN HYDROCHLORIDE 0.4 MG/1
0.4 CAPSULE ORAL DAILY
Qty: 30 CAPSULE | Refills: 0 | Status: SHIPPED | OUTPATIENT
Start: 2023-06-28 | End: 2023-06-29

## 2023-06-29 ENCOUNTER — TELEPHONE (OUTPATIENT)
Dept: INTERNAL MEDICINE CLINIC | Facility: CLINIC | Age: 73
End: 2023-06-29

## 2023-06-29 ENCOUNTER — TELEPHONE (OUTPATIENT)
Dept: CARDIOLOGY CLINIC | Facility: CLINIC | Age: 73
End: 2023-06-29

## 2023-06-29 DIAGNOSIS — R35.0 BENIGN PROSTATIC HYPERPLASIA WITH URINARY FREQUENCY: ICD-10-CM

## 2023-06-29 DIAGNOSIS — N40.1 BENIGN PROSTATIC HYPERPLASIA WITH URINARY FREQUENCY: ICD-10-CM

## 2023-06-29 RX ORDER — TAMSULOSIN HYDROCHLORIDE 0.4 MG/1
0.4 CAPSULE ORAL DAILY
Qty: 90 CAPSULE | Refills: 3 | Status: SHIPPED | OUTPATIENT
Start: 2023-06-29

## 2023-07-16 RX ORDER — HYDROCHLOROTHIAZIDE 12.5 MG/1
12.5 CAPSULE, GELATIN COATED ORAL DAILY
Qty: 90 CAPSULE | Refills: 1 | Status: SHIPPED | OUTPATIENT
Start: 2023-07-16

## 2023-07-16 NOTE — TELEPHONE ENCOUNTER
Please review. Protocol failed / No Protocol. Requested Prescriptions   Pending Prescriptions Disp Refills    hydroCHLOROthiazide 12.5 MG Oral Cap 90 capsule 1     Sig: Take 1 capsule (12.5 mg total) by mouth daily. Hypertensive Medications Protocol Failed - 7/14/2023  2:48 PM        Failed - CMP or BMP in past 6 months     No results found for this or any previous visit (from the past 4392 hour(s)).             Passed - In person appointment in the past 12 or next 3 months     Recent Outpatient Visits              2 weeks ago Urine frequency    Cassie Louise, [address], APRN    Office Visit    4 months ago Dyslipidemia    Elias Zepeda MD    Office Visit    8 months ago Elevated PSA    [address] [facility], Luciano Gar MD    Office Visit    10 months ago Gastroesophageal reflux disease without esophagitis    Elias Zepeda MD    Office Visit    1 year ago Insulin resistance    [address] [facility], Britta Andrade MD    Office Visit          Future Appointments         Provider Department Appt Notes    In 1 month Scott Hatch MD [address], [address] [facility] Annual checkup  last px 3/16/2022    In 1 month Cong Torres MD [address], [address] [facility] [place] follow-up visit               Passed - Last BP reading less than 140/90     BP Readings from Last 1 Encounters:  06/28/23 : 130/70              Passed - In person appointment or virtual visit in the past 6 months     Recent Outpatient Visits              2 weeks ago Urine frequency    [address], [address] [facility], [address], APRN    Office Visit    4 months ago Dyslipidemia    [facility], [address] [facility] Suraj Bryant MD    Office Visit    8 months ago Elevated PSA    [address], [place]    Office Visit    10 months ago Gastroesophageal reflux disease without esophagitis    Suraj Bryant, Karla Horner MD    Office Visit    1 year ago Insulin resistance    [address], Bre Jordan MD    Office Visit          Future Appointments         Provider Department Appt Notes    In 1 month Dayna Avalos MD [address], [address] [facility] Annual checkup  last px 3/16/2022    In 1 month Jairo Mcintosh MD [address], [address], [place] follow-up visit               Passed - [facility] or GFRNAA > 50     GFR Evaluation  EGFRCR: 57 , resulted on 9/6/2022

## 2023-07-24 ENCOUNTER — TELEPHONE (OUTPATIENT)
Dept: INTERNAL MEDICINE CLINIC | Facility: CLINIC | Age: 73
End: 2023-07-24

## 2023-07-24 NOTE — TELEPHONE ENCOUNTER
Received fax requesting refill to be sent to another pharmacy   [address] [phone]    Current Outpatient Medications:       hydroCHLOROthiazide 12.5 MG Oral Cap, Take 1 capsule (12.5 mg total) by mouth daily. , Disp: 90 capsule, Rfl: 1

## 2023-07-24 NOTE — TELEPHONE ENCOUNTER
Called patient & left message that script is currently at a [facility] here in [address] [facility] in [facility] is able to see it & fill for him.

## 2023-07-28 ENCOUNTER — OFFICE VISIT (OUTPATIENT)
Dept: INTERNAL MEDICINE CLINIC | Facility: CLINIC | Age: 73
End: 2023-07-28

## 2023-07-28 ENCOUNTER — NURSE TRIAGE (OUTPATIENT)
Dept: INTERNAL MEDICINE CLINIC | Facility: CLINIC | Age: 73
End: 2023-07-28

## 2023-07-28 VITALS
RESPIRATION RATE: 18 BRPM | HEART RATE: 59 BPM | OXYGEN SATURATION: 98 % | TEMPERATURE: 98 F | SYSTOLIC BLOOD PRESSURE: 126 MMHG | BODY MASS INDEX: 31.27 KG/M2 | WEIGHT: 216 LBS | DIASTOLIC BLOOD PRESSURE: 74 MMHG | HEIGHT: 69.8 IN

## 2023-07-28 DIAGNOSIS — K62.5 RECTAL BLEED: ICD-10-CM

## 2023-07-28 DIAGNOSIS — K64.8 HEMORRHOIDS, INTERNAL: ICD-10-CM

## 2023-07-28 DIAGNOSIS — R35.0 FREQUENT URINATION: ICD-10-CM

## 2023-07-28 DIAGNOSIS — R31.9 HEMATURIA, UNSPECIFIED TYPE: Primary | ICD-10-CM

## 2023-07-28 LAB
BASOPHILS # BLD AUTO: 0.03 X10(3) UL (ref 0–0.2)
BASOPHILS NFR BLD AUTO: 0.5 %
BILIRUB UR QL: NEGATIVE
BILIRUBIN: NEGATIVE
CLARITY UR: CLEAR
COLOR UR: YELLOW
DEPRECATED RDW RBC AUTO: 44.2 FL (ref 35.1–46.3)
EOSINOPHIL # BLD AUTO: 0.13 X10(3) UL (ref 0–0.7)
EOSINOPHIL NFR BLD AUTO: 2.2 %
ERYTHROCYTE [DISTWIDTH] IN BLOOD BY AUTOMATED COUNT: 13.5 % (ref 11–15)
GLUCOSE (URINE DIPSTICK): NEGATIVE MG/DL
GLUCOSE UR-MCNC: NORMAL MG/DL
HCT VFR BLD AUTO: 46.7 %
HGB BLD-MCNC: 15.6 G/DL
IMM GRANULOCYTES # BLD AUTO: 0.01 X10(3) UL (ref 0–1)
IMM GRANULOCYTES NFR BLD: 0.2 %
KETONES (URINE DIPSTICK): NEGATIVE MG/DL
KETONES UR-MCNC: NEGATIVE MG/DL
LEUKOCYTE ESTERASE UR QL STRIP.AUTO: NEGATIVE
LYMPHOCYTES # BLD AUTO: 1.39 X10(3) UL (ref 1–4)
LYMPHOCYTES NFR BLD AUTO: 23.7 %
MCH RBC QN AUTO: 29.8 PG (ref 26–34)
MCHC RBC AUTO-ENTMCNC: 33.4 G/DL (ref 31–37)
MCV RBC AUTO: 89.1 FL
MONOCYTES # BLD AUTO: 0.58 X10(3) UL (ref 0.1–1)
MONOCYTES NFR BLD AUTO: 9.9 %
MULTISTIX LOT#: ABNORMAL NUMERIC
NEUTROPHILS # BLD AUTO: 3.73 X10 (3) UL (ref 1.5–7.7)
NEUTROPHILS # BLD AUTO: 3.73 X10(3) UL (ref 1.5–7.7)
NEUTROPHILS NFR BLD AUTO: 63.5 %
NITRITE UR QL STRIP.AUTO: NEGATIVE
NITRITE, URINE: NEGATIVE
PH UR: 8 [PH] (ref 5–8)
PH, URINE: 8.5 (ref 4.5–8)
PLATELET # BLD AUTO: 253 10(3)UL (ref 150–450)
PROT UR-MCNC: 20 MG/DL
RBC # BLD AUTO: 5.24 X10(6)UL
RBC #/AREA URNS AUTO: >10 /HPF
SP GR UR STRIP: 1.02 (ref 1–1.03)
SPECIFIC GRAVITY: 1.01 (ref 1–1.03)
UROBILINOGEN UR STRIP-ACNC: 2
UROBILINOGEN,SEMI-QN: 2 MG/DL (ref 0–1.9)
WBC # BLD AUTO: 5.9 X10(3) UL (ref 4–11)

## 2023-07-28 PROCEDURE — 99214 OFFICE O/P EST MOD 30 MIN: CPT | Performed by: INTERNAL MEDICINE

## 2023-07-28 PROCEDURE — 36415 COLL VENOUS BLD VENIPUNCTURE: CPT | Performed by: INTERNAL MEDICINE

## 2023-07-28 PROCEDURE — 81003 URINALYSIS AUTO W/O SCOPE: CPT | Performed by: INTERNAL MEDICINE

## 2023-07-28 PROCEDURE — 1126F AMNT PAIN NOTED NONE PRSNT: CPT | Performed by: INTERNAL MEDICINE

## 2023-07-28 NOTE — TELEPHONE ENCOUNTER
Please reply to pool: EM RN TRIAGE  Action Requested: Summary for Provider     []  Critical Lab, Recommendations Needed  [] Need Additional Advice  [x]   FYI    []   Need Orders  [] Need Medications Sent to Pharmacy  []  Other     SUMMARY: Patient called states he has had a significant amount of blood in his urine. Hx of elevated PSA. He reports the following: Urinary Urgency: denies. Urinary Frequency: present for several weeks. Urinary Foul Odor: denies. Dysuria: denies. Color of Urine: Red. Clarity of Urine: clear. Hematuria: present --> He states it seems like an initial spurt of blood then becomes diluted with duration of urination; however whole toilet bowl water is getting red. Fever/Chills: denies. Back/Flank pain: denies. Drinking about 20 oz a day, mouth is moist --> he was encouraged to increase fluid intake to 2 L each day. Urinating normal output and urine stream normal in pressure. At last visit he was referred to a Urologist [has not called yet and contact information was provided] :    Tom Whitfield, APRN [address] [id number] [phone]     He states he thinks there may be some blood in the stool as well. Denies any abdominal pain or cramping. He has been doing \"body-20\", an exercise program that is intense and focused on stimulating muscles --> otherwise no known injury. Same day office visit offered --> agreeable and scheduled. Home Care Advice discussed, per protocol. Patient instructed to call us with any worsening symptoms or seek immediate medical attention. Patient verbalized understanding. No further questions or concerns at this time.     Future Appointments   Date Time Provider Jean-Paul Howell   7/28/2023  2:00 PM Neil Silvestre MD [id number] [place] [address]   8/31/2023 11:45 AM [facility]   9/7/2023  3:20 PM Vincent Angel MD [facility]     Reason for call: Urinary Symptoms  Onset: Yesterday    Reason for Disposition   All other patients with blood in urine  (Exception: Could be normal menstrual bleeding.)    Protocols used: Urine - Blood In-A-OH

## 2023-08-01 ENCOUNTER — TELEPHONE (OUTPATIENT)
Dept: INTERNAL MEDICINE CLINIC | Facility: CLINIC | Age: 73
End: 2023-08-01

## 2023-08-01 NOTE — TELEPHONE ENCOUNTER
Patient request refill to be sent to the [facility]     Current Outpatient Medications:       hydroCHLOROthiazide 12.5 MG Oral Cap, Take 1 capsule (12.5 mg total) by mouth daily. , Disp: 90 capsule, Rfl: 1

## 2023-08-02 NOTE — PROGRESS NOTES
Subjective:     Patient ID: Argelia Emmanuel is a 67year old male. HPI  Patient comes in today with complaint of hematuria yesterday and today. Also has had possible blood in the stool but is not sure if it was coming from the urine he does have history of hemorrhoids internal  He has been constipated lately. No burning no frequency  History/Other:   Review of Systems   Constitutional: Negative. HENT: Negative. Eyes: Negative. Respiratory: Negative. Cardiovascular: Negative. Gastrointestinal:  Positive for blood in stool. Genitourinary:  Positive for hematuria. Negative for dysuria and frequency. Musculoskeletal: Negative. Skin: Negative. Neurological: Negative. Psychiatric/Behavioral: Negative. Current Outpatient Medications   Medication Sig Dispense Refill    hydroCHLOROthiazide 12.5 MG Oral Cap Take 1 capsule (12.5 mg total) by mouth daily. 90 capsule 1    tamsulosin 0.4 MG Oral Cap Take 1 capsule (0.4 mg total) by mouth daily. 90 capsule 3    buPROPion  MG Oral Tablet 24 Hr Take 1 tablet (300 mg total) by mouth daily. 90 tablet 1    FLUoxetine 10 MG Oral Tab Take 1 tablet (10 mg total) by mouth daily. 90 tablet 1    omeprazole 20 MG Oral Capsule Delayed Release Take 1 capsule (20 mg total) by mouth daily. 90 capsule 1    Sildenafil Citrate 100 MG Oral Tab Take 1 tablet (100 mg total) by mouth daily as needed for Erectile Dysfunction. (Patient not taking: Reported on 6/28/2023) 10 tablet 11    naltrexone 50 MG Oral Tab naltrexone 50 mg tablet   TAKE 1 TABLET BY MOUTH EVERY DAY      atorvastatin 10 MG Oral Tab Take 1 tablet (10 mg total) by mouth daily. 90 tablet 1    ARIPiprazole 2 MG Oral Tab Take 1 tablet (2 mg total) by mouth daily. (Patient not taking: Reported on 6/28/2023)      Diethylpropion HCl ER 75 MG Oral Tablet 24 Hr Take 1 tablet (75 mg total) by mouth daily.  (Patient not taking: Reported on 6/28/2023)      escitalopram 20 MG Oral Tab Take 1 tablet (20 mg total) by mouth daily. 90 tablet 1    diclofenac 75 MG Oral Tab EC TAKE 1 TABLET BY MOUTH DAILY WITH FOOD AS NEEDED (Patient not taking: Reported on 6/28/2023) 30 tablet 2    Diclofenac Sodium 1 % External Gel Apply 2 g topically 4 (four) times daily. (Patient not taking: Reported on 6/28/2023) 1 each 0    valACYclovir HCl 1 G Oral Tab Take 1 tablet (1,000 mg total) by mouth as needed. (Patient not taking: Reported on 6/28/2023)       Allergies:No Known Allergies    Past Medical History:   Diagnosis Date    Anxiety     Depression     Hypercholesteremia     Insulin resistance 6/30/2021    Obesity (BMI 30-39. 9)     Obesity (BMI 30-39.9) 6/30/2021    HEYDI on CPAP     Primary osteoarthritis of both knees     Primary osteoarthritis of both knees     RLS (restless legs syndrome)     Sleep apnea     Weight gain 10/14/2021      Past Surgical History:   Procedure Laterality Date    TONSILLECTOMY        Family History   Problem Relation Age of Onset    Heart Disorder Father     Other (alzhiemers) Mother       Social History:   Social History     Socioeconomic History    Marital status:    Tobacco Use    Smoking status: Former    Smokeless tobacco: Never    Tobacco comments:     Quit 30 years ago    Vaping Use    Vaping Use: Never used   Substance and Sexual Activity    Alcohol use: Yes     Alcohol/week: 3.0 standard drinks of alcohol     Types: 3 Standard drinks or equivalent per week    Drug use: Never   Other Topics Concern     Service Yes    Blood Transfusions No    Caffeine Concern Yes     Comment: coffee, soda, tea, energy drinks    Occupational Exposure No    Hobby Hazards No    Sleep Concern No    Stress Concern No    Weight Concern Yes    Special Diet No    Back Care No    Exercise No    Bike Helmet No    Seat Belt Yes    Self-Exams Yes        Objective:   Physical Exam  Vitals and nursing note reviewed. Constitutional:       Appearance: He is well-developed.    HENT:      Head: Normocephalic and atraumatic. Right Ear: External ear normal.      Left Ear: External ear normal.      Nose: Nose normal.   Eyes:      Conjunctiva/sclera: Conjunctivae normal.      Pupils: Pupils are equal, round, and reactive to light. Cardiovascular:      Rate and Rhythm: Normal rate and regular rhythm. Heart sounds: Normal heart sounds. Pulmonary:      Effort: Pulmonary effort is normal.      Breath sounds: Normal breath sounds. Abdominal:      General: Bowel sounds are normal.      Palpations: Abdomen is soft. Genitourinary:     Penis: Normal.       Prostate: Normal.      Rectum: Normal.   Musculoskeletal:         General: Normal range of motion. Cervical back: Normal range of motion and neck supple. Skin:     General: Skin is warm and dry. Neurological:      Mental Status: He is alert and oriented to person, place, and time. Deep Tendon Reflexes: Reflexes are normal and symmetric.          Assessment & Plan:   Hematuria, unspecified type  (primary encounter diagnosis) we will send UA we will refer to urology any worsening symptoms need to let us know  Frequent urination we will follow cultures  Rectal bleed we will check CBC make sure no serious bleeding any recurrence need to go to ER  Hemorrhoids, internal as above    Orders Placed This Encounter      CBC With Differential With Platelet      UA/M WITH CULTURE REFLEX [3020]      POC Urinalysis, Automated Dip without microscopy (PCA and EMMG ONLY) [97312]      Meds This Visit:  Requested Prescriptions      No prescriptions requested or ordered in this encounter       Imaging & Referrals:  UROLOGY - INTERNAL

## 2023-08-31 ENCOUNTER — OFFICE VISIT (OUTPATIENT)
Facility: CLINIC | Age: 73
End: 2023-08-31

## 2023-08-31 VITALS
HEIGHT: 69.8 IN | OXYGEN SATURATION: 98 % | SYSTOLIC BLOOD PRESSURE: 122 MMHG | WEIGHT: 214 LBS | RESPIRATION RATE: 14 BRPM | BODY MASS INDEX: 30.98 KG/M2 | DIASTOLIC BLOOD PRESSURE: 80 MMHG | HEART RATE: 62 BPM

## 2023-08-31 DIAGNOSIS — A60.01 HERPES SIMPLEX INFECTION OF PENIS: ICD-10-CM

## 2023-08-31 DIAGNOSIS — M17.0 PRIMARY OSTEOARTHRITIS OF BOTH KNEES: ICD-10-CM

## 2023-08-31 DIAGNOSIS — I10 PRIMARY HYPERTENSION: ICD-10-CM

## 2023-08-31 DIAGNOSIS — K21.9 GASTROESOPHAGEAL REFLUX DISEASE WITHOUT ESOPHAGITIS: ICD-10-CM

## 2023-08-31 DIAGNOSIS — M15.9 PRIMARY OSTEOARTHRITIS INVOLVING MULTIPLE JOINTS: ICD-10-CM

## 2023-08-31 DIAGNOSIS — R31.9 PAINLESS HEMATURIA: ICD-10-CM

## 2023-08-31 DIAGNOSIS — F32.5 MAJOR DEPRESSIVE DISORDER IN FULL REMISSION, UNSPECIFIED WHETHER RECURRENT (HCC): ICD-10-CM

## 2023-08-31 DIAGNOSIS — E78.5 DYSLIPIDEMIA: Primary | ICD-10-CM

## 2023-08-31 DIAGNOSIS — Z23 NEED FOR PNEUMOCOCCAL VACCINATION: ICD-10-CM

## 2023-08-31 DIAGNOSIS — Z12.5 SCREENING PSA (PROSTATE SPECIFIC ANTIGEN): ICD-10-CM

## 2023-08-31 DIAGNOSIS — Z51.81 ENCOUNTER FOR THERAPEUTIC DRUG MONITORING: ICD-10-CM

## 2023-08-31 DIAGNOSIS — G47.33 OSA ON CPAP: ICD-10-CM

## 2023-08-31 DIAGNOSIS — R97.20 ELEVATED PSA: ICD-10-CM

## 2023-08-31 DIAGNOSIS — N40.0 BENIGN PROSTATIC HYPERPLASIA WITHOUT LOWER URINARY TRACT SYMPTOMS: ICD-10-CM

## 2023-08-31 DIAGNOSIS — E66.09 OBESITY DUE TO EXCESS CALORIES WITHOUT SERIOUS COMORBIDITY, UNSPECIFIED CLASSIFICATION: ICD-10-CM

## 2023-08-31 PROBLEM — E66.9 OBESITY (BMI 30-39.9): Status: RESOLVED | Noted: 2021-06-30 | Resolved: 2023-08-31

## 2023-08-31 PROBLEM — F32.A DEPRESSION: Status: RESOLVED | Noted: 2020-09-01 | Resolved: 2023-08-31

## 2023-08-31 PROCEDURE — 99213 OFFICE O/P EST LOW 20 MIN: CPT | Performed by: INTERNAL MEDICINE

## 2023-08-31 PROCEDURE — G0009 ADMIN PNEUMOCOCCAL VACCINE: HCPCS | Performed by: INTERNAL MEDICINE

## 2023-08-31 PROCEDURE — 90677 PCV20 VACCINE IM: CPT | Performed by: INTERNAL MEDICINE

## 2023-08-31 PROCEDURE — 1126F AMNT PAIN NOTED NONE PRSNT: CPT | Performed by: INTERNAL MEDICINE

## 2023-08-31 PROCEDURE — G0439 PPPS, SUBSEQ VISIT: HCPCS | Performed by: INTERNAL MEDICINE

## 2023-08-31 RX ORDER — LORAZEPAM 0.5 MG/1
0.5 TABLET ORAL DAILY PRN
COMMUNITY

## 2023-08-31 RX ORDER — ROPINIROLE 0.5 MG/1
0.5 TABLET, FILM COATED ORAL DAILY PRN
COMMUNITY

## 2023-08-31 RX ORDER — VALACYCLOVIR HYDROCHLORIDE 1 G/1
1000 TABLET, FILM COATED ORAL DAILY
Qty: 5 TABLET | Refills: 5 | Status: SHIPPED | OUTPATIENT
Start: 2023-08-31 | End: 2023-09-05

## 2023-09-07 ENCOUNTER — LAB ENCOUNTER (OUTPATIENT)
Dept: LAB | Facility: HOSPITAL | Age: 73
End: 2023-09-07
Attending: INTERNAL MEDICINE
Payer: MEDICARE

## 2023-09-07 ENCOUNTER — OFFICE VISIT (OUTPATIENT)
Dept: SURGERY | Facility: CLINIC | Age: 73
End: 2023-09-07

## 2023-09-07 DIAGNOSIS — N40.1 BENIGN PROSTATIC HYPERPLASIA WITH NOCTURIA: ICD-10-CM

## 2023-09-07 DIAGNOSIS — N28.9 ABNORMAL KIDNEY FUNCTION: Primary | ICD-10-CM

## 2023-09-07 DIAGNOSIS — E78.5 DYSLIPIDEMIA: ICD-10-CM

## 2023-09-07 DIAGNOSIS — R97.20 ELEVATED PSA: ICD-10-CM

## 2023-09-07 DIAGNOSIS — I10 PRIMARY HYPERTENSION: ICD-10-CM

## 2023-09-07 DIAGNOSIS — Z12.5 SCREENING PSA (PROSTATE SPECIFIC ANTIGEN): ICD-10-CM

## 2023-09-07 DIAGNOSIS — R35.1 BENIGN PROSTATIC HYPERPLASIA WITH NOCTURIA: ICD-10-CM

## 2023-09-07 DIAGNOSIS — R31.0 GROSS HEMATURIA: Primary | ICD-10-CM

## 2023-09-07 LAB
ALBUMIN SERPL-MCNC: 3.6 G/DL (ref 3.4–5)
ALBUMIN/GLOB SERPL: 1 {RATIO} (ref 1–2)
ALP LIVER SERPL-CCNC: 67 U/L
ALT SERPL-CCNC: 21 U/L
ANION GAP SERPL CALC-SCNC: 8 MMOL/L (ref 0–18)
AST SERPL-CCNC: 19 U/L (ref 15–37)
BILIRUB SERPL-MCNC: 0.8 MG/DL (ref 0.1–2)
BUN BLD-MCNC: 25 MG/DL (ref 7–18)
BUN/CREAT SERPL: 16.1 (ref 10–20)
CALCIUM BLD-MCNC: 9.2 MG/DL (ref 8.5–10.1)
CHLORIDE SERPL-SCNC: 106 MMOL/L (ref 98–112)
CHOLEST SERPL-MCNC: 141 MG/DL (ref ?–200)
CO2 SERPL-SCNC: 28 MMOL/L (ref 21–32)
COMPLEXED PSA SERPL-MCNC: 7.86 NG/ML (ref ?–4)
CREAT BLD-MCNC: 1.55 MG/DL
EGFRCR SERPLBLD CKD-EPI 2021: 47 ML/MIN/1.73M2 (ref 60–?)
FASTING PATIENT LIPID ANSWER: NO
FASTING STATUS PATIENT QL REPORTED: NO
GLOBULIN PLAS-MCNC: 3.7 G/DL (ref 2.8–4.4)
GLUCOSE BLD-MCNC: 96 MG/DL (ref 70–99)
HDLC SERPL-MCNC: 54 MG/DL (ref 40–59)
LDLC SERPL CALC-MCNC: 53 MG/DL (ref ?–100)
NONHDLC SERPL-MCNC: 87 MG/DL (ref ?–130)
OSMOLALITY SERPL CALC.SUM OF ELEC: 298 MOSM/KG (ref 275–295)
POTASSIUM SERPL-SCNC: 3.4 MMOL/L (ref 3.5–5.1)
PROT SERPL-MCNC: 7.3 G/DL (ref 6.4–8.2)
SODIUM SERPL-SCNC: 142 MMOL/L (ref 136–145)
TRIGL SERPL-MCNC: 216 MG/DL (ref 30–149)
TSI SER-ACNC: 1.83 MIU/ML (ref 0.36–3.74)
VLDLC SERPL CALC-MCNC: 31 MG/DL (ref 0–30)

## 2023-09-07 PROCEDURE — 84443 ASSAY THYROID STIM HORMONE: CPT

## 2023-09-07 PROCEDURE — 80061 LIPID PANEL: CPT

## 2023-09-07 PROCEDURE — 36415 COLL VENOUS BLD VENIPUNCTURE: CPT

## 2023-09-07 PROCEDURE — 99213 OFFICE O/P EST LOW 20 MIN: CPT | Performed by: UROLOGY

## 2023-09-07 PROCEDURE — 80053 COMPREHEN METABOLIC PANEL: CPT

## 2023-09-07 NOTE — PROGRESS NOTES
Etelvina Rios is a 67year old male. HPI:   Patient presents with:  elevated psa: PSA done today in process  Hematuria: Pt states about a month ago, thinks he saw blood in urine when he was out of town a few months ago 7/28/23 ua showed RBC 8      79-year-old male in follow-up to a previous visit October 31, 2022 with a history of BPH, elevated serum PSA. He states about 2 to 3 months ago he had urinary urgency accompanied by intermittent episodes of gross blood in the urine. No clots and no obstructive symptoms otherwise. He followed up with his primary care physician and was started on tamsulosin. States this has resulted in about a 50% improvement in his baseline urination symptoms. IPSS score today is 19, quality-of-life index is 2. Repeat PSA as per our last discussion was done this morning but the results are still pending. Digital prostate exam at last visit in October showed a 75 to 80 g prostate smooth without masses or nodules. Most recent PSA September 6, 2022 and 5.59. Again he has no known family history of prostate cancer. Urine culture at the time of his episodes June 28, 2023 demonstrated no growth. His previous work-up for elevated PSA included a prostate [place] [facility] which was unremarkable, repeat prostate MRI December 2022 showing no suspicious lesions. This too is classified as PI-RADS 2. He denies any gross hematuria at present. HISTORY:  Past Medical History:   Diagnosis Date    Anxiety     Depression     Hypercholesteremia     Insulin resistance 6/30/2021    Obesity (BMI 30-39. 9)     Obesity (BMI 30-39.9) 6/30/2021    HEYDI on CPAP     Primary osteoarthritis of both knees     Primary osteoarthritis of both knees     RLS (restless legs syndrome)     Sleep apnea     Weight gain 10/14/2021      Past Surgical History:   Procedure Laterality Date    TONSILLECTOMY        Family History   Problem Relation Age of Onset    Heart Disorder Father     Other (alzhiemers) Mother Social History:   Social History     Socioeconomic History    Marital status:    Tobacco Use    Smoking status: Former    Smokeless tobacco: Never    Tobacco comments:     Quit 30 years ago    Vaping Use    Vaping Use: Never used   Substance and Sexual Activity    Alcohol use: Yes     Alcohol/week: 3.0 standard drinks of alcohol     Types: 3 Standard drinks or equivalent per week    Drug use: Never   Other Topics Concern     Service Yes    Blood Transfusions No    Caffeine Concern Yes     Comment: coffee, soda, tea, energy drinks    Occupational Exposure No    Hobby Hazards No    Sleep Concern No    Stress Concern No    Weight Concern Yes    Special Diet No    Back Care No    Exercise No    Bike Helmet No    Seat Belt Yes    Self-Exams Yes        Medications (Active prior to today's visit):  Current Outpatient Medications   Medication Sig Dispense Refill    rOPINIRole 0.5 MG Oral Tab Take 1 tablet (0.5 mg total) by mouth daily as needed. LORazepam 0.5 MG Oral Tab Take 1 tablet (0.5 mg total) by mouth daily as needed for Anxiety. hydroCHLOROthiazide 12.5 MG Oral Cap Take 1 capsule (12.5 mg total) by mouth daily. 90 capsule 1    tamsulosin 0.4 MG Oral Cap Take 1 capsule (0.4 mg total) by mouth daily. 90 capsule 3    buPROPion  MG Oral Tablet 24 Hr Take 1 tablet (300 mg total) by mouth daily. 90 tablet 1    FLUoxetine 10 MG Oral Tab Take 1 tablet (10 mg total) by mouth daily. 90 tablet 1    omeprazole 20 MG Oral Capsule Delayed Release Take 1 capsule (20 mg total) by mouth daily. 90 capsule 1    Sildenafil Citrate 100 MG Oral Tab Take 1 tablet (100 mg total) by mouth daily as needed for Erectile Dysfunction. 10 tablet 11    naltrexone 50 MG Oral Tab naltrexone 50 mg tablet   TAKE 1 TABLET BY MOUTH EVERY DAY      atorvastatin 10 MG Oral Tab Take 1 tablet (10 mg total) by mouth daily.  90 tablet 1    diclofenac 75 MG Oral Tab EC TAKE 1 TABLET BY MOUTH DAILY WITH FOOD AS NEEDED 30 tablet 2    Diclofenac Sodium 1 % External Gel Apply 2 g topically 4 (four) times daily. 1 each 0       Allergies:  No Known Allergies      ROS:       PHYSICAL EXAM:        ASSESSMENT/PLAN:   Assessment   Elevated psa  Benign prostatic hyperplasia with nocturia  Gross hematuria  (primary encounter diagnosis)    Recommend:  - Gross hematuria: Recommended further evaluation with office cystoscopy and CT urogram.  These tests were explained to the patient and he understands and agrees and he will be scheduled accordingly. - Elevated serum PSA: Follow-up on PSA from today. Orders This Visit:  No orders of the defined types were placed in this encounter.       Meds This Visit:  Requested Prescriptions      No prescriptions requested or ordered in this encounter       Imaging & Referrals:  None     9/7/2023  Brendan Truong MD

## 2023-09-14 ENCOUNTER — HOSPITAL ENCOUNTER (EMERGENCY)
Facility: HOSPITAL | Age: 73
Discharge: HOME | End: 2023-09-14
Attending: EMERGENCY MEDICINE
Payer: MEDICARE

## 2023-09-14 ENCOUNTER — APPOINTMENT (OUTPATIENT)
Dept: GENERAL RADIOLOGY | Facility: HOSPITAL | Age: 73
End: 2023-09-14
Payer: MEDICARE

## 2023-09-14 VITALS
WEIGHT: 215 LBS | HEART RATE: 64 BPM | DIASTOLIC BLOOD PRESSURE: 57 MMHG | SYSTOLIC BLOOD PRESSURE: 130 MMHG | OXYGEN SATURATION: 95 % | TEMPERATURE: 98 F | RESPIRATION RATE: 16 BRPM | HEIGHT: 71 IN | BODY MASS INDEX: 30.1 KG/M2

## 2023-09-14 DIAGNOSIS — S76.312A STRAIN OF LEFT HAMSTRING, INITIAL ENCOUNTER: Primary | ICD-10-CM

## 2023-09-14 PROCEDURE — 99284 EMERGENCY DEPT VISIT MOD MDM: CPT

## 2023-09-14 PROCEDURE — 72170 X-RAY EXAM OF PELVIS: CPT

## 2023-09-14 PROCEDURE — 96374 THER/PROPH/DIAG INJ IV PUSH: CPT

## 2023-09-14 PROCEDURE — 73502 X-RAY EXAM HIP UNI 2-3 VIEWS: CPT

## 2023-09-14 RX ORDER — DIAZEPAM 5 MG/1
5 TABLET ORAL ONCE
Status: COMPLETED | OUTPATIENT
Start: 2023-09-14 | End: 2023-09-14

## 2023-09-14 RX ORDER — LIDOCAINE 50 MG/G
1 PATCH TOPICAL EVERY 24 HOURS
Qty: 6 PATCH | Refills: 0 | Status: SHIPPED | OUTPATIENT
Start: 2023-09-14 | End: 2023-09-20

## 2023-09-14 RX ORDER — MORPHINE SULFATE 4 MG/ML
8 INJECTION, SOLUTION INTRAMUSCULAR; INTRAVENOUS ONCE
Status: COMPLETED | OUTPATIENT
Start: 2023-09-14 | End: 2023-09-14

## 2023-09-14 RX ORDER — HYDROCODONE BITARTRATE AND ACETAMINOPHEN 5; 325 MG/1; MG/1
1 TABLET ORAL EVERY 6 HOURS PRN
Qty: 15 TABLET | Refills: 0 | Status: SHIPPED | OUTPATIENT
Start: 2023-09-14

## 2023-09-14 RX ORDER — CYCLOBENZAPRINE HCL 10 MG
5 TABLET ORAL 3 TIMES DAILY PRN
Qty: 5 TABLET | Refills: 0 | Status: SHIPPED | OUTPATIENT
Start: 2023-09-14 | End: 2023-09-17

## 2023-09-14 NOTE — ED INITIAL ASSESSMENT (HPI)
Steve Elkins arrived through triage with his Wife for c/o L hip pain with difficulty weight bearing x30 minutes. States he over-stretched his leg while attempting to step into his boat.

## 2023-09-26 ENCOUNTER — TELEPHONE (OUTPATIENT)
Dept: SURGERY | Facility: CLINIC | Age: 73
End: 2023-09-26

## 2023-10-02 NOTE — TELEPHONE ENCOUNTER
S/W pt's assistant Meghan Diego and JENNY reschd pt's office cysto from 10/17 to 10/30 at 10:30 am and instructed that pt needs to arrive at 10 am.

## 2023-10-23 DIAGNOSIS — F32.5 MAJOR DEPRESSIVE DISORDER IN FULL REMISSION, UNSPECIFIED WHETHER RECURRENT (HCC): ICD-10-CM

## 2023-10-23 RX ORDER — ATORVASTATIN CALCIUM 10 MG/1
10 TABLET, FILM COATED ORAL DAILY
Qty: 90 TABLET | Refills: 3 | Status: SHIPPED | OUTPATIENT
Start: 2023-10-23

## 2023-10-23 NOTE — TELEPHONE ENCOUNTER
Current Outpatient Medications:       atorvastatin 10 MG Oral Tab, Take 1 tablet (10 mg total) by mouth daily. , Disp: 90 tablet, Rfl: 1

## 2023-10-24 ENCOUNTER — TELEPHONE (OUTPATIENT)
Dept: SURGERY | Facility: CLINIC | Age: 73
End: 2023-10-24

## 2023-10-24 RX ORDER — BUPROPION HYDROCHLORIDE 300 MG/1
300 TABLET ORAL DAILY
Qty: 90 TABLET | Refills: 1 | Status: SHIPPED | OUTPATIENT
Start: 2023-10-24

## 2023-10-24 NOTE — TELEPHONE ENCOUNTER
Refill passed per [place] [facility], [facility] protocol. Requested Prescriptions   Pending Prescriptions Disp Refills    atorvastatin 10 MG Oral Tab 90 tablet 1     Sig: Take 1 tablet (10 mg total) by mouth daily.        Cholesterol Medication Protocol Passed - 10/23/2023  3:51 PM        Passed - ALT in past 12 months        Passed - LDL in past 12 months        Passed - Last ALT < 80     Lab Results   Component Value Date    ALT 21 09/07/2023             Passed - Last LDL < 130     Lab Results   Component Value Date    LDL 53 09/07/2023             Passed - In person appointment or virtual visit in the past 12 mos or appointment in next 3 mos     Recent Outpatient Visits              1 month ago Gross hematuria    Katharina Dennis, Blanco Thompson MD    Office Visit    1 month ago Dyslipidemia    Arnulfo Vilchis MD    Office Visit    2 months ago Hematuria, unspecified type    [facility], [address], Nimo Avalos MD    Office Visit    3 months ago Urine frequency    Phan Solomon, [address] [facility], [address] [facility], APRN    Office Visit    7 months ago Dyslipidemia    Arnulfo Vilchis MD    Office Visit          Future Appointments         Provider Department Appt Notes    In 1 week MD Phan Jensen, [address], [place] cysto ( gross hematuria)  -pt to arrive at 10 am

## 2023-10-24 NOTE — TELEPHONE ENCOUNTER
Refill passed per [facility] protocol.     Requested Prescriptions   Pending Prescriptions Disp Refills    BUPROPION  MG Oral Tablet 24 Hr [Pharmacy Med Name: BUPROPION XL 300MG TABLETS] 90 tablet 1     Sig: TAKE 1 TABLET(300 MG) BY MOUTH DAILY       Psychiatric Non-Scheduled (Anti-Anxiety) Passed - 10/23/2023 12:32 PM        Passed - In person appointment or virtual visit in the past 6 mos or appointment in next 3 mos     Recent Outpatient Visits              1 month ago Gross hematuria    [facility], [address], [place] Brannon MD    Office Visit    1 month ago Dyslipidemia    Arnulfo Alcantar MD    Office Visit    2 months ago Hematuria, unspecified type    [facility], [address], [place]    Office Visit    3 months ago Urine frequency    [address], [address] [place], [address], APRN    Office Visit    7 months ago Dyslipidemia    Arnulfo Alcantar MD    Office Visit          Future Appointments         Provider Department Appt Notes    In 6 days Aubrey Carrasquillo MD [address], [address], [place] cysto ( gross hematuria)  -pt to arrive at 10 am                           Recent Outpatient Visits              1 month ago Gross hematuria    [address], [address], [place] Brannon MD    Office Visit    1 month ago Dyslipidemia    Arnulfo Alcantar MD    Office Visit    2 months ago Hematuria, unspecified type    [address], [address], [place]    Office Visit    3 months ago Urine frequency    [address], [address] [place], [address], APRN    Office Visit    7 months ago Dyslipidemia    [facility], [facility] Damion Carney MD    Office Visit            Future Appointments         Provider Department Appt Notes    In 6 days Pierre Denney MD [address] [facility],[address], [address], [place] cysto ( gross hematuria)  -pt to arrive at 10 am

## 2023-10-25 NOTE — TELEPHONE ENCOUNTER
Spoke with patients assistant Payal Cabrera, assisted in rescheduling procedure.  She is aware that patient is to arrive at 10 am.     Future Appointments   Date Time Provider Jean-Paul Howell   11/20/2023 10:30 AM Pierre Denney MD [facility]

## 2023-11-20 ENCOUNTER — PROCEDURE (OUTPATIENT)
Dept: SURGERY | Facility: CLINIC | Age: 73
End: 2023-11-20

## 2023-11-20 VITALS — HEART RATE: 67 BPM | DIASTOLIC BLOOD PRESSURE: 76 MMHG | SYSTOLIC BLOOD PRESSURE: 125 MMHG

## 2023-11-20 DIAGNOSIS — R97.20 ELEVATED PSA: ICD-10-CM

## 2023-11-20 DIAGNOSIS — R35.1 BENIGN PROSTATIC HYPERPLASIA WITH NOCTURIA: ICD-10-CM

## 2023-11-20 DIAGNOSIS — N40.1 BENIGN PROSTATIC HYPERPLASIA WITH NOCTURIA: ICD-10-CM

## 2023-11-20 DIAGNOSIS — R31.0 GROSS HEMATURIA: Primary | ICD-10-CM

## 2023-11-20 NOTE — PROGRESS NOTES
Carla Miranda is a 67year old male. HPI:     Chief Complaint   Patient presents with    Hematuria     Cystoscopy       69-year-old male in follow-up to visit September 7, 2023. Has a history of BPH, lower urinary tract symptoms, elevated serum PSA. Had previous episodes of gross hematuria but no recurrences since he was last seen in September. An order for a CT urogram was placed but the patient has not yet obtained and again stressed the importance of getting this done as soon as possible. Known to have BPH with a 75 to 80 g prostate based on digital prostate exams previously. No known family history of prostate cancer. Had a prostate MRI at [facility] which was unremarkable and this was repeated with a prostate MRI here December 2022 showing PI-RADS 2 classification. He presents today for office cystoscopy. PSA September 7, 2023 7.86    HISTORY:  Past Medical History:   Diagnosis Date    Anxiety     Depression     Hypercholesteremia     Insulin resistance 6/30/2021    Obesity (BMI 30-39. 9)     Obesity (BMI 30-39.9) 6/30/2021    HEYDI on CPAP     Primary osteoarthritis of both knees     Primary osteoarthritis of both knees     RLS (restless legs syndrome)     Sleep apnea     Weight gain 10/14/2021      Past Surgical History:   Procedure Laterality Date    TONSILLECTOMY        Family History   Problem Relation Age of Onset    Heart Disorder Father     Other (alzhiemers) Mother       Social History:   Social History     Socioeconomic History    Marital status:    Tobacco Use    Smoking status: Former    Smokeless tobacco: Never    Tobacco comments:     Quit 30 years ago    Vaping Use    Vaping Use: Never used   Substance and Sexual Activity    Alcohol use:  Yes     Alcohol/week: 3.0 standard drinks of alcohol     Types: 3 Standard drinks or equivalent per week    Drug use: Never   Other Topics Concern     Service Yes    Blood Transfusions No    Caffeine Concern Yes     Comment: coffee, soda, tea, energy drinks    Occupational Exposure No    Hobby Hazards No    Sleep Concern No    Stress Concern No    Weight Concern Yes    Special Diet No    Back Care No    Exercise No    Bike Helmet No    Seat Belt Yes    Self-Exams Yes        Medications (Active prior to today's visit):  Current Outpatient Medications   Medication Sig Dispense Refill    buPROPion  MG Oral Tablet 24 Hr Take 1 tablet (300 mg total) by mouth daily. 90 tablet 1    atorvastatin 10 MG Oral Tab Take 1 tablet (10 mg total) by mouth daily. 90 tablet 3    HYDROcodone-acetaminophen 5-325 MG Oral Tab Take 1 tablet by mouth every 6 (six) hours as needed. 15 tablet 0    rOPINIRole 0.5 MG Oral Tab Take 1 tablet (0.5 mg total) by mouth daily as needed. LORazepam 0.5 MG Oral Tab Take 1 tablet (0.5 mg total) by mouth daily as needed for Anxiety. hydroCHLOROthiazide 12.5 MG Oral Cap Take 1 capsule (12.5 mg total) by mouth daily. 90 capsule 1    tamsulosin 0.4 MG Oral Cap Take 1 capsule (0.4 mg total) by mouth daily. 90 capsule 3    FLUoxetine 10 MG Oral Tab Take 1 tablet (10 mg total) by mouth daily. 90 tablet 1    omeprazole 20 MG Oral Capsule Delayed Release Take 1 capsule (20 mg total) by mouth daily. 90 capsule 1    Sildenafil Citrate 100 MG Oral Tab Take 1 tablet (100 mg total) by mouth daily as needed for Erectile Dysfunction. 10 tablet 11    naltrexone 50 MG Oral Tab naltrexone 50 mg tablet   TAKE 1 TABLET BY MOUTH EVERY DAY      diclofenac 75 MG Oral Tab EC TAKE 1 TABLET BY MOUTH DAILY WITH FOOD AS NEEDED 30 tablet 2    Diclofenac Sodium 1 % External Gel Apply 2 g topically 4 (four) times daily. 1 each 0       Allergies:  No Known Allergies      ROS:       PHYSICAL EXAM:   Zach Aguilera  : 1950  Referring Physician: No ref.  provider found     Chief Complaint   Patient presents with    Hematuria     Cystoscopy           CYSTOSCOPY    Anesthesia:  2% lidocaine gel    Urethra: Normal  Prostate / Pelvic: Normal  Bladder: Normal.  No tumor, stone, diverticulum, or glomerulation  U.O's: Normal  Trabeculation: grade 2      POST CYSTOSCOPY MEDICATIONS: sample one tablet Cipro 500mg given to patient    DIAGNOSIS:     PLAN: see below       ASSESSMENT/PLAN:   Assessment   Encounter Diagnoses   Name Primary? Elevated PSA     Benign prostatic hyperplasia with nocturia     Gross hematuria Yes       Reviewed findings at length with patient. Recommended he obtain a CT urogram as soon as possible. I advised the patient to return to clinic in 6 months with a repeat PSA and a urinalysis at that time to rule out persistent microhematuria and to reassess his prostate PSA levels. He understands the importance of follow-up as discussed and will do so accordingly. Orders This Visit:  No orders of the defined types were placed in this encounter.       Meds This Visit:  Requested Prescriptions      No prescriptions requested or ordered in this encounter       Imaging & Referrals:  None     11/20/2023  Tory Gallardo MD

## 2023-12-29 ENCOUNTER — TELEPHONE (OUTPATIENT)
Dept: INTERNAL MEDICINE CLINIC | Facility: CLINIC | Age: 73
End: 2023-12-29

## 2023-12-29 NOTE — TELEPHONE ENCOUNTER
Patient called to get his lab results, unable to view in [facility]. Patient was advised of lab results and still needs to repeat the CMP.    Patient is currently in [place] until June. Patient will call back with Fax number to a lab location in [place]. Patient will check with his insurance first.

## 2024-04-18 ENCOUNTER — OFFICE VISIT (OUTPATIENT)
Facility: CLINIC | Age: 74
End: 2024-04-18

## 2024-04-18 VITALS
SYSTOLIC BLOOD PRESSURE: 130 MMHG | BODY MASS INDEX: 28.98 KG/M2 | DIASTOLIC BLOOD PRESSURE: 78 MMHG | WEIGHT: 207 LBS | HEART RATE: 60 BPM | RESPIRATION RATE: 16 BRPM | HEIGHT: 71 IN

## 2024-04-18 DIAGNOSIS — I10 PRIMARY HYPERTENSION: ICD-10-CM

## 2024-04-18 DIAGNOSIS — R97.20 ELEVATED PSA: ICD-10-CM

## 2024-04-18 DIAGNOSIS — E66.09 OBESITY DUE TO EXCESS CALORIES WITHOUT SERIOUS COMORBIDITY, UNSPECIFIED CLASSIFICATION: ICD-10-CM

## 2024-04-18 DIAGNOSIS — R31.29 MICROSCOPIC HEMATURIA: ICD-10-CM

## 2024-04-18 DIAGNOSIS — E78.5 DYSLIPIDEMIA: Primary | ICD-10-CM

## 2024-04-18 PROCEDURE — 99214 OFFICE O/P EST MOD 30 MIN: CPT | Performed by: INTERNAL MEDICINE

## 2024-04-18 RX ORDER — SILDENAFIL 100 MG/1
100 TABLET, FILM COATED ORAL
Qty: 10 TABLET | Refills: 1 | Status: SHIPPED | OUTPATIENT
Start: 2024-04-18

## 2024-04-18 NOTE — PROGRESS NOTES
Zach Aguilera is a 73 year old male.  Chief Complaint   Patient presents with    Hypertension    Depression     HPI:   73-year-old gentleman here for follow-up   He stays in [place] now.  He has not followed up with CT urogram and urology recommendations.  He denies any more hematuria.  Depression is stable.  He is taking Mounjaro and losing weight.      Current Outpatient Medications   Medication Sig Dispense Refill    Sildenafil Citrate 100 MG Oral Tab Take 1 tablet (100 mg total) by mouth daily as needed for Erectile Dysfunction. 10 tablet 1    buPROPion  MG Oral Tablet 24 Hr Take 1 tablet (300 mg total) by mouth daily. 90 tablet 1    atorvastatin 10 MG Oral Tab Take 1 tablet (10 mg total) by mouth daily. 90 tablet 3    rOPINIRole 0.5 MG Oral Tab Take 1 tablet (0.5 mg total) by mouth daily as needed.      LORazepam 0.5 MG Oral Tab Take 1 tablet (0.5 mg total) by mouth daily as needed for Anxiety.      hydroCHLOROthiazide 12.5 MG Oral Cap Take 1 capsule (12.5 mg total) by mouth daily. 90 capsule 1    tamsulosin 0.4 MG Oral Cap Take 1 capsule (0.4 mg total) by mouth daily. 90 capsule 3    FLUoxetine 10 MG Oral Tab Take 1 tablet (10 mg total) by mouth daily. 90 tablet 1    omeprazole 20 MG Oral Capsule Delayed Release Take 1 capsule (20 mg total) by mouth daily. 90 capsule 1    naltrexone 50 MG Oral Tab naltrexone 50 mg tablet   TAKE 1 TABLET BY MOUTH EVERY DAY      Diclofenac Sodium 1 % External Gel Apply 2 g topically 4 (four) times daily. 1 each 0      Past Medical History:    Anxiety    Depression    Hypercholesteremia    Insulin resistance    Obesity (BMI 30-39.9)    Obesity (BMI 30-39.9)    HEYDI on CPAP    Primary osteoarthritis of both knees    Primary osteoarthritis of both knees    RLS (restless legs syndrome)    Sleep apnea    Weight gain      Past Surgical History:   Procedure Laterality Date    Tonsillectomy        Social History:  Social History     Socioeconomic History    Marital status:     Tobacco Use    Smoking status: Former    Smokeless tobacco: Never    Tobacco comments:     Quit 30 years ago    Vaping Use    Vaping status: Never Used   Substance and Sexual Activity    Alcohol use: Yes     Alcohol/week: 3.0 standard drinks of alcohol     Types: 3 Standard drinks or equivalent per week    Drug use: Never   Other Topics Concern     Service Yes    Blood Transfusions No    Caffeine Concern Yes     Comment: coffee, soda, tea, energy drinks    Occupational Exposure No    Hobby Hazards No    Sleep Concern No    Stress Concern No    Weight Concern Yes    Special Diet No    Back Care No    Exercise No    Bike Helmet No    Seat Belt Yes    Self-Exams Yes      Family History   Problem Relation Age of Onset    Heart Disorder Father     Other (alzhiemers) Mother       No Known Allergies     REVIEW OF SYSTEMS:   Review of Systems   Review of Systems   Constitutional: Negative for activity change, appetite change and fever.   HENT: Negative for congestion and voice change.    Respiratory: Negative for cough and shortness of breath.    Cardiovascular: Negative for chest pain.   Gastrointestinal: Negative for abdominal distention, abdominal pain and vomiting.   Genitourinary: Negative for hematuria.   Skin: Negative for wound.   Psychiatric/Behavioral: Negative for behavioral problems.   Wt Readings from Last 5 Encounters:   04/18/24 207 lb (93.9 kg)   09/14/23 215 lb (97.5 kg)   08/31/23 214 lb (97.1 kg)   07/28/23 216 lb (98 kg)   06/28/23 219 lb (99.3 kg)     Body mass index is 28.87 kg/m².      EXAM:   /78   Pulse 60   Resp 16   Ht 5' 11\" (1.803 m)   Wt 207 lb (93.9 kg)   BMI 28.87 kg/m²   Physical Exam   Constitutional:       Appearance: Normal appearance.   HENT:      Head: Normocephalic.   Eyes:      Conjunctiva/sclera: Conjunctivae normal.   Cardiovascular:      Rate and Rhythm: Normal rate and regular rhythm.      Heart sounds: Normal heart sounds. No murmur heard.  Pulmonary:       Effort: Pulmonary effort is normal.      Breath sounds: Normal breath sounds. No rhonchi or rales.   Abdominal:      General: Bowel sounds are normal.      Palpations: Abdomen is soft.      Tenderness: There is no abdominal tenderness.   Musculoskeletal:      Cervical back: Neck supple.      Right lower leg: No edema.      Left lower leg: No edema.   Skin:     General: Skin is warm and dry.   Neurological:      General: No focal deficit present.      Mental Status: He is alert and oriented to person, place, and time. Mental status is at baseline.   Psychiatric:         Mood and Affect: Mood normal.         Behavior: Behavior normal.       ASSESSMENT AND PLAN:   1. Dyslipidemia  Lab Results   Component Value Date    LDL 53 09/07/2023    AST 19 09/07/2023    ALT 21 09/07/2023      Encouraged patient to eat healthy.  Avoid fat fried foods and increase activity as tolerated.  We will monitor lipid profile and liver function test.      2. Primary hypertension  Lab Results   Component Value Date    CREATSERUM 1.55 (H) 09/07/2023    TSH 1.830 09/07/2023     Will continue to monitor blood pressure.  Encouraged patient to avoid salt.  Continue current medical regimen.  Kidney functions noted.  Will repeat it after drinking plenty of fluids.  Avoid NSAIDs.  - Basic Metabolic Panel (8); Future    3. Microscopic hematuria  Discussed with the patient regarding portance of CT urogram and close follow-up with urology.  - Urology Referral - [facility])  - UA Microscopic only, urine; Future    4. Elevated PSA  As #3  - UA Microscopic only, urine; Future  - PSA - Diagnostic [E]; Future    5. Obesity due to excess calories without serious comorbidity, unspecified classification  Taking Mounjaro under the supervision of obesity medicine in [place].    Plan: Provided with a printout for CT urogram.  Urine microscopy and PSA ordered.  To complete before urology appointment.  Basic metabolic panel ordered.  Avoid  NSAIDs  I will see him back in 6 months      The patient indicates understanding of these issues and agrees to the plan.  No follow-ups on file.    This note was prepared using Dragon Medical voice recognition dictation software. As a result errors may occur. When identified these errors have been corrected. While every attempt is made to correct errors during dictation discrepancies may still exist.

## 2024-05-14 ENCOUNTER — MED REC SCAN ONLY (OUTPATIENT)
Dept: INTERNAL MEDICINE CLINIC | Facility: CLINIC | Age: 74
End: 2024-05-14

## 2024-06-06 ENCOUNTER — LAB ENCOUNTER (OUTPATIENT)
Dept: LAB | Facility: HOSPITAL | Age: 74
End: 2024-06-06
Attending: INTERNAL MEDICINE
Payer: MEDICARE

## 2024-06-06 DIAGNOSIS — R97.20 ELEVATED PSA: ICD-10-CM

## 2024-06-06 DIAGNOSIS — I10 PRIMARY HYPERTENSION: ICD-10-CM

## 2024-06-06 DIAGNOSIS — R31.29 MICROSCOPIC HEMATURIA: ICD-10-CM

## 2024-06-06 LAB
ANION GAP SERPL CALC-SCNC: 5 MMOL/L (ref 0–18)
BUN BLD-MCNC: 21 MG/DL (ref 9–23)
BUN/CREAT SERPL: 16.4 (ref 10–20)
CALCIUM BLD-MCNC: 9.8 MG/DL (ref 8.7–10.4)
CHLORIDE SERPL-SCNC: 106 MMOL/L (ref 98–112)
CO2 SERPL-SCNC: 29 MMOL/L (ref 21–32)
CREAT BLD-MCNC: 1.28 MG/DL
EGFRCR SERPLBLD CKD-EPI 2021: 59 ML/MIN/1.73M2 (ref 60–?)
FASTING STATUS PATIENT QL REPORTED: YES
GLUCOSE BLD-MCNC: 90 MG/DL (ref 70–99)
OSMOLALITY SERPL CALC.SUM OF ELEC: 293 MOSM/KG (ref 275–295)
POTASSIUM SERPL-SCNC: 3.5 MMOL/L (ref 3.5–5.1)
PSA SERPL-MCNC: 5.28 NG/ML (ref ?–4)
SODIUM SERPL-SCNC: 140 MMOL/L (ref 136–145)

## 2024-06-06 PROCEDURE — 36415 COLL VENOUS BLD VENIPUNCTURE: CPT

## 2024-06-06 PROCEDURE — 84153 ASSAY OF PSA TOTAL: CPT

## 2024-06-06 PROCEDURE — 81015 MICROSCOPIC EXAM OF URINE: CPT

## 2024-06-06 PROCEDURE — 80048 BASIC METABOLIC PNL TOTAL CA: CPT

## 2024-06-10 ENCOUNTER — HOSPITAL ENCOUNTER (OUTPATIENT)
Dept: CT IMAGING | Facility: HOSPITAL | Age: 74
Discharge: HOME | End: 2024-06-10
Attending: UROLOGY
Payer: MEDICARE

## 2024-06-10 DIAGNOSIS — R31.0 GROSS HEMATURIA: ICD-10-CM

## 2024-06-10 PROCEDURE — 74178 CT ABD&PLV WO CNTR FLWD CNTR: CPT | Performed by: UROLOGY

## 2024-06-10 RX ORDER — IOHEXOL 350 MG/ML
80 INJECTION, SOLUTION INTRAVENOUS
Status: COMPLETED | OUTPATIENT
Start: 2024-06-10 | End: 2024-06-10

## 2024-06-10 RX ADMIN — IOHEXOL 80 ML: 350 INJECTION, SOLUTION INTRAVENOUS at 09:52:00

## 2024-06-12 ENCOUNTER — TELEPHONE (OUTPATIENT)
Dept: SURGERY | Facility: CLINIC | Age: 74
End: 2024-06-12

## 2024-06-12 NOTE — TELEPHONE ENCOUNTER
----- Message from Femi Martinez sent at 6/11/2024  9:01 PM CDT -----  Urology staff,  Please call this patient.  Let him know that I reviewed his recent CT urogram.  This demonstrates normal-appearing kidneys ureters and bladder.  There is marked prostate enlargement with a median lobe of the prostate protruding into the bladder.  There is mild nonspecific right-sided pelvic lymph node enlargement.  This is of unclear etiology.  Elevated PSA but this has been stable and he has had multiple prostate MRIs and digital prostate exam showing no suspicious features.  Additionally, his urinalysis recently was unremarkable.  The etiology of the enlarged right sided pelvic lymph nodes is unclear.  Please make sure he has an appointment to see me but these nodes may need to be biopsied by interventional radiology to rule out malignant reasons for the past.  I will send this message also to the patient's primary care physician for his information.  Please make sure I see him the week I get back from vacation.

## 2024-06-12 NOTE — TELEPHONE ENCOUNTER
-Call-Center transferred pt to Urology; identity verified with name & .  -I read message to pt from [facility] as stated below.  -B is out-of-office & returning 24.    -Pt leaves on vacation 24.  -I scheduled phone visit 24 @ 11:30am/ [phone].  -Also made appt for 24 @ 9:40am.  -Encounter  complete.

## 2024-06-18 ENCOUNTER — OFFICE VISIT (OUTPATIENT)
Dept: SURGERY | Facility: CLINIC | Age: 74
End: 2024-06-18

## 2024-06-18 DIAGNOSIS — R59.1 LYMPHADENOPATHY: Primary | ICD-10-CM

## 2024-06-18 PROCEDURE — 99214 OFFICE O/P EST MOD 30 MIN: CPT | Performed by: UROLOGY

## 2024-06-18 NOTE — PROGRESS NOTES
Patience Wilkerson MD  Department of Urology  [address] [place] [address] [address]  [place] [address]    T: [phone]  F: [phone]    To: Leon Nicholas MD   [address] [facility] [address]    Re: Zach Aguilera   MRN: [id number]  : 1950    Dear Leon Nicholas MD,    Today I had the pleasure of seeing Zach Aguilera in my clinic. As you know, Mr. Aguilera is a 73 year old year old male who I am seeing for follow-up of CT scan as my partner is on vacation. Patient was last seen in this department on 2023.    Briefly, patient is a known patient of Dr. Martinez since .  Dr. Martinez is out of office and patient was unable to wait until his scheduled visit and therefore was scheduled with me to review his CT scan results only.    On chart review it appears that patient is followed Dr. Martinez for lower urinary tract symptoms and elevated PSA.  He has also had gross hematuria in the past.      With regard to his elevated PSA, his PSAs have been relatively stable since .  He has reportedly had a prostate MRI in the past last in  which demonstrated multiple BPH nodules, no peripheral zone lesions and no PI-RADS significant lesions.  His prostate did measure 102 g.  There were no lymph nodes noted on MRI from , his seminal vesicles were normal and there is no bony lesions.  Plan for his PSA in 2023 was to return to clinic in 6 months with a repeat PSA and urine analysis at that time.  His most recent PSA was 5.28     PSA Screen TOTAL PSA   Latest Ref Rng <=4.00 ng/mL <=4.00 ng/mL   10/1/2020 5.22 (H)     2022 5.59 (H)     2023 7.86 (H)     2024  5.28 (H)       Legend:  (H) High    Please note that he did have a gross hematuria workup in 2023 with a cystoscopy demonstrating grade 2 trabeculation but no bladder abnormalities.  He has not had a cytology in the system.  Patient did not complete the CT scan at the time it was requested and had it completed on  6/8/2024.    The CT urogram demonstrated mild nonspecific perinephric stranding of the urinary tract, no renal masses, no nephrolithiasis.  He did have an enlarged prostate and a trabeculated bladder.  A 1 cm x 1.1 cm right posterior pelvic obturator node and a 1.1 cm x 1.6 cm right sided iliac chain node was also reported.  He had a fat-containing umbilical and supraumbilical hernia.    Dr. Martinez promptly reviewed the CT scan and stated that patient should follow-up after he returns to clinic.  He considered discussing biopsy of these nodes to rule out malignant lesions.    As stated above his most recent PSA was 5.28.  His urine analysis did not demonstrate any white blood cells or red blood cells    Please note that I had a chaperone at my request throughout the start of the visit (NAA Smith).     PAST MEDICAL HISTORY:  Past Medical History:    Anxiety    Depression    Hypercholesteremia    Insulin resistance    Obesity (BMI 30-39.9)    Obesity (BMI 30-39.9)    HEYDI on CPAP    Primary osteoarthritis of both knees    Primary osteoarthritis of both knees    RLS (restless legs syndrome)    Sleep apnea    Weight gain        PAST SURGICAL HISTORY:  Past Surgical History:   Procedure Laterality Date    Tonsillectomy           ALLERGIES:  No Known Allergies      MEDICATIONS:  Current Outpatient Medications   Medication Instructions    atorvastatin (LIPITOR) 10 mg, Oral, Daily    buPROPion ER (WELLBUTRIN XL) 300 mg, Oral, Daily    diclofenac (VOLTAREN) 2 g, Topical, 4 times daily    FLUoxetine (PROZAC) 10 mg, Oral, Daily    hydroCHLOROthiazide 12.5 mg, Oral, Daily    LORazepam (ATIVAN) 0.5 mg, Oral, DAILY PRN    naltrexone 50 MG Oral Tab naltrexone 50 mg tablet   TAKE 1 TABLET BY MOUTH EVERY DAY    omeprazole (PRILOSEC) 20 mg, Oral, Daily    rOPINIRole (REQUIP) 0.5 mg, Oral, DAILY PRN    Sildenafil Citrate (VIAGRA) 100 mg, Oral, Daily as needed    tamsulosin (FLOMAX) 0.4 mg, Oral, Daily        FAMILY HISTORY:  Family  History   Problem Relation Age of Onset    Heart Disorder Father     Other (alzhiemers) Mother         SOCIAL HISTORY:  Social History     Socioeconomic History    Marital status:    Tobacco Use    Smoking status: Former    Smokeless tobacco: Never    Tobacco comments:     Quit 30 years ago    Vaping Use    Vaping status: Never Used   Substance and Sexual Activity    Alcohol use: Yes     Alcohol/week: 3.0 standard drinks of alcohol     Types: 3 Standard drinks or equivalent per week    Drug use: Never   Other Topics Concern     Service Yes    Blood Transfusions No    Caffeine Concern Yes     Comment: coffee, soda, tea, energy drinks    Occupational Exposure No    Hobby Hazards No    Sleep Concern No    Stress Concern No    Weight Concern Yes    Special Diet No    Back Care No    Exercise No    Bike Helmet No    Seat Belt Yes    Self-Exams Yes          PHYSICAL EXAMINATION:  There were no vitals filed for this visit.  CONSTITUTIONAL: No apparent distress, cooperative and communicative  NEUROLOGIC: Alert and oriented   HEAD: Normocephalic, atraumatic   EYES: Sclera non-icteric   ENT: Hearing intact, moist mucous membranes   NECK: No obvious goiter or masses   RESPIRATORY: Normal respiratory effort, Nonlabored breathing on room air  SKIN: No evident rashes         REVIEW OF SYSTEMS:    A comprehensive 10-point review of systems was completed.  Pertinent positives and negatives are noted in the the HPI.       LABORATORY DATA:  URINE CULTURE No Growth at 18-24 hrs.              IMAGING REVIEW:  Narrative   PROCEDURE: CT UROGRAM (W+WO)  (CPT=74178)     COMPARISON: None.     INDICATIONS: Gross hematuria     TECHNIQUE:   CT images of the abdomen and pelvis were obtained without and with non-ionic intravenous contrast material. Automated exposure control for dose reduction was used. Adjustment of the mA and/or kV was done based on the patient's size. Use of  iterative reconstruction technique for dose  reduction was used.  Dose information is transmitted to the ACR (American College of Radiology) NRDR (National Radiology Data Registry) which includes the Dose Index Registry.     FINDINGS:  URINARY TRACT: Mild nonspecific bilateral perinephric stranding.  No renal mass.  No hydroureteronephrosis or urinary tract calculus.  Prompt uptake and excretion of contrast by both kidneys into normal pelvic calyceal systems and ureters.  Mildly  trabeculated urinary bladder.  Enlarged prostate protrudes into the bladder trigone..  ADRENALS: Normal.  LIVER: Normal.    SPLEEN: Normal.    PANCREAS: Chronic pancreatic volume loss.  No mass or pancreatitis.  BILIARY: No evidence for cholecystitis or biliary dilatation.  AORTA/VASCULAR: Atherosclerotic vascular calcification.  Major aortic branches are patent.  No aneurysm or dissection.    LYMPHADENOPATHY:  A 10 x 11 mm right posterior pelvic sidewall/obturator, and a more superior right-sided iliac chain known measuring approximately 11 x 16 mm.  GI/MESENTERY: Small hiatal hernia.  Colonic diverticulosis.  Normal appendix.  No obstruction, bowel wall thickening or mesenteric mass.  ABDOMINAL WALL: Fat containing umbilical and supraumbilical hernia.  ASCITES:   None.  PELVIC ORGANS: Marked prostate enlargement with median lobe protruding into bladder trigone.  BONES: No suspect bone lesion.  LUNG BASES: Minimal dependent atelectasis.  OTHER: Mild gynecomastia.               Impression   CONCLUSION:  1. No urolithiasis or renal obstruction.  2. Marked prostate enlargement with median lobe protruding into the bladder trigone.  3. Mildly trabeculated urinary bladder.  4. Mild nonspecific right-sided pelvic lymphadenopathy.  Differential includes metastatic disease.             Dictated by (CST): Ras Francois MD on 6/10/2024 at 3:19 PM      Finalized by (CST): Ras Francois MD on 6/10/2024 at 3:35 PM          OTHER RELEVANT DATA:   none     IMPRESSION: Two enlarged pelvic lymph  nodes of unclear significance-stated that plan will need to be discussed with his primary urologist on how to proceed.  I suggested that options include biopsy of the lymph nodes to determine their significance versus repeat imaging versus discussion at multidisciplinary conference.    Will have patient follow-up as planned with Dr. Martinez. He also wanted to discuss plan with interventional radiology to see if biopsy of these lymph nodes were feasible. Consult placed.  If IR feels biopsy is not possible, he has a follow up with Dr. Martinez for further discussion. I also told him he had the option of a second opinion at any facility.    It is reassuring that patient has had a stable PSA over the last 4 years (has gone down to 2020 levels) and a stable/normal prostate MRI. His enlarged prostate is likely contributing to his PSA elevation. However, based on discussion with IR, and pending biopsy results, may need to continue to follow PSA or consider other workup for evaluation of elevated PSA (repeat MRI, biopsy, continued PSA checks).         PLAN:  Follow-up with Dr. Martinez as planned to discuss management options especially if biopsy not feasible. Could consider advanced or repeat imaging.  IR consult placed per patient request  Continue to follow up elevated PSA as discussed above and with patient      30 minutes were spent on this patient at this visit obtaining a history, reviewing medical records, developing a treatment plan, counseling and discussing treatment strategy with patient, coordination of care and documentation.     The 21st Century Cures Act makes medical notes available to patients in the interest of transparency.  However, please be advised that this is a medical document.  It is intended as a peer to peer communication.  It is written in medical language and may contain abbreviations or verbiage that are technical and unfamiliar.  It may appear blunt or direct.  Medical documents are intended to  carry relevant information, facts as evident, and the clinical opinion of the practitioner.

## 2024-06-19 ENCOUNTER — TELEPHONE (OUTPATIENT)
Dept: SURGERY | Facility: CLINIC | Age: 74
End: 2024-06-19

## 2024-06-19 ENCOUNTER — TELEPHONE (OUTPATIENT)
Dept: INTERNAL MEDICINE CLINIC | Facility: CLINIC | Age: 74
End: 2024-06-19

## 2024-06-19 ENCOUNTER — OFFICE VISIT (OUTPATIENT)
Dept: OTOLARYNGOLOGY | Facility: CLINIC | Age: 74
End: 2024-06-19

## 2024-06-19 VITALS — WEIGHT: 206 LBS | BODY MASS INDEX: 28.84 KG/M2 | HEIGHT: 71 IN

## 2024-06-19 DIAGNOSIS — H91.92 PROGRESSIVE HEARING LOSS OF LEFT EAR: ICD-10-CM

## 2024-06-19 DIAGNOSIS — H91.8X3 ASYMMETRICAL HEARING LOSS: Primary | ICD-10-CM

## 2024-06-19 DIAGNOSIS — R59.1 LYMPHADENOPATHY: Primary | ICD-10-CM

## 2024-06-19 PROCEDURE — 99203 OFFICE O/P NEW LOW 30 MIN: CPT | Performed by: SPECIALIST

## 2024-06-19 NOTE — TELEPHONE ENCOUNTER
Patient is requesting to speak to the Nurse about getting the order for interventional radiology corrected. Patient states that he needs to get test done before he leaves to [place].

## 2024-06-20 NOTE — PATIENT INSTRUCTIONS
We reviewed your audiogram from 2021 and 1 from 5/28/2024.  There does seem to be an asymmetry with the left ear being worse in the right ear.  There is also a decreased word discrimination on the left ear compared to the right ear.  As this is the case, an MRI was recommended to rule out retrocochlear disease.  There is no emergency for you to do this.  I will of course notify you of the results after the procedure.

## 2024-06-20 NOTE — PROGRESS NOTES
Zach Aguilera is a 73 year old male.   Chief Complaint   Patient presents with    New Patient    Hearing Loss     Patient is here for hearing loss issues, pt had hearing test done 05/28/2024. Pt wears hearing aids, here to reinsert espinoza hearing aids.     HPI:   Patient here with progressive left hearing loss.  The audiogram from 6/11/2021 compared to 1 done recently on 5/28/2024.    Current Outpatient Medications   Medication Sig Dispense Refill    Sildenafil Citrate 100 MG Oral Tab Take 1 tablet (100 mg total) by mouth daily as needed for Erectile Dysfunction. 10 tablet 1    buPROPion  MG Oral Tablet 24 Hr Take 1 tablet (300 mg total) by mouth daily. 90 tablet 1    atorvastatin 10 MG Oral Tab Take 1 tablet (10 mg total) by mouth daily. 90 tablet 3    rOPINIRole 0.5 MG Oral Tab Take 1 tablet (0.5 mg total) by mouth daily as needed.      LORazepam 0.5 MG Oral Tab Take 1 tablet (0.5 mg total) by mouth daily as needed for Anxiety.      hydroCHLOROthiazide 12.5 MG Oral Cap Take 1 capsule (12.5 mg total) by mouth daily. 90 capsule 1    tamsulosin 0.4 MG Oral Cap Take 1 capsule (0.4 mg total) by mouth daily. 90 capsule 3    FLUoxetine 10 MG Oral Tab Take 1 tablet (10 mg total) by mouth daily. 90 tablet 1    omeprazole 20 MG Oral Capsule Delayed Release Take 1 capsule (20 mg total) by mouth daily. 90 capsule 1    naltrexone 50 MG Oral Tab naltrexone 50 mg tablet   TAKE 1 TABLET BY MOUTH EVERY DAY      Diclofenac Sodium 1 % External Gel Apply 2 g topically 4 (four) times daily. 1 each 0      Past Medical History:    Anxiety    Depression    Hypercholesteremia    Insulin resistance    Obesity (BMI 30-39.9)    Obesity (BMI 30-39.9)    HEYDI on CPAP    Primary osteoarthritis of both knees    Primary osteoarthritis of both knees    RLS (restless legs syndrome)    Sleep apnea    Weight gain      Social History:  Social History     Socioeconomic History    Marital status:    Tobacco Use    Smoking status: Former     Smokeless tobacco: Never    Tobacco comments:     Quit 30 years ago    Vaping Use    Vaping status: Never Used   Substance and Sexual Activity    Alcohol use: Yes     Alcohol/week: 3.0 standard drinks of alcohol     Types: 3 Standard drinks or equivalent per week    Drug use: Never   Other Topics Concern     Service Yes    Blood Transfusions No    Caffeine Concern Yes     Comment: coffee, soda, tea, energy drinks    Occupational Exposure No    Hobby Hazards No    Sleep Concern No    Stress Concern No    Weight Concern Yes    Special Diet No    Back Care No    Exercise No    Bike Helmet No    Seat Belt Yes    Self-Exams Yes        REVIEW OF SYSTEMS:   GENERAL HEALTH: feels well otherwise  GENERAL : denies fever, chills, sweats, weight loss, weight gain  SKIN: denies any unusual skin lesions or rashes  RESPIRATORY: denies shortness of breath with exertion  NEURO: denies headaches    EXAM:   Ht 5' 11\" (1.803 m)   Wt 206 lb (93.4 kg)   BMI 28.73 kg/m²   System Details   Skin Inspection - Normal.   Constitutional Overall appearance - Normal.   Head/Face Facial features - Normal. Eyebrows - Normal. Skull - Normal.   Eyes Conjunctiva - Right: Normal, Left: Normal. Pupil - Right: Normal, Left: Normal.    Ears Inspection - Right: Normal, Left: Normal.   Canal -espinoza hearing aids removed and ears were cleaned.  Hearing aids were then replaced.  TM - Right: Normal, Left: Normal.   Nasal External nose - Normal.   Nasal septum - Normal.  Turbinates - Normal.   Oral/Oropharynx Lips - Normal, Tonsils - Normal, Tongue - Normal    Neck Exam Inspection - Normal. Palpation - Normal. Parotid gland - Normal. Thyroid gland - Normal.  No carotid bruits by auscultation   Lymph Detail Submental. Submandibular. Anterior cervical. Posterior cervical. Supraclavicular all without enlargement   Psychiatric Orientation - Oriented to time, place, person & situation. Appropriate mood and affect.   Neurological Memory - Normal. Cranial  nerves - Cranial nerves II through XII grossly intact.     ASSESSMENT AND PLAN:   1. Asymmetrical hearing loss  Left worse than right.  Both audiograms were reviewed with the patient on the date of the visit.  - MRI IACS (W+WO) (CPT=70553); Future    2. Progressive hearing loss of left ear  Progressive loss in the left ear from November 2021 to May 2024.  There is also a decreased word discrimination score which is 96% on the right and 84% on the left.  As there is no prior imaging, an MRI of the internal auditory canals with and without contrast was ordered.  - MRI IACS (W+WO) (CPT=70553); Future      The patient indicates understanding of these issues and agrees to the plan.      Yasemin Ang MD  6/19/2024  8:31 PM

## 2024-06-21 DIAGNOSIS — F32.5 MAJOR DEPRESSIVE DISORDER IN FULL REMISSION, UNSPECIFIED WHETHER RECURRENT (HCC): ICD-10-CM

## 2024-06-21 DIAGNOSIS — K21.9 GASTROESOPHAGEAL REFLUX DISEASE WITHOUT ESOPHAGITIS: ICD-10-CM

## 2024-06-22 ENCOUNTER — PATIENT MESSAGE (OUTPATIENT)
Dept: OTHER | Age: 74
End: 2024-06-22

## 2024-06-22 ENCOUNTER — TELEPHONE (OUTPATIENT)
Dept: INTERNAL MEDICINE CLINIC | Facility: CLINIC | Age: 74
End: 2024-06-22

## 2024-06-22 DIAGNOSIS — F32.5 MAJOR DEPRESSIVE DISORDER IN FULL REMISSION, UNSPECIFIED WHETHER RECURRENT (HCC): ICD-10-CM

## 2024-06-22 DIAGNOSIS — K21.9 GASTROESOPHAGEAL REFLUX DISEASE WITHOUT ESOPHAGITIS: ICD-10-CM

## 2024-06-22 RX ORDER — OMEPRAZOLE 20 MG/1
20 CAPSULE, DELAYED RELEASE ORAL DAILY
Qty: 90 CAPSULE | Refills: 1 | Status: SHIPPED | OUTPATIENT
Start: 2024-06-22

## 2024-06-22 RX ORDER — FLUOXETINE 10 MG/1
10 TABLET, FILM COATED ORAL DAILY
Qty: 90 TABLET | Refills: 1 | Status: SHIPPED | OUTPATIENT
Start: 2024-06-22

## 2024-06-22 RX ORDER — OMEPRAZOLE 20 MG/1
20 CAPSULE, DELAYED RELEASE ORAL DAILY
Qty: 90 CAPSULE | Refills: 1 | OUTPATIENT
Start: 2024-06-22

## 2024-06-22 RX ORDER — FLUOXETINE 10 MG/1
10 CAPSULE ORAL DAILY
Qty: 90 CAPSULE | Refills: 0 | OUTPATIENT
Start: 2024-06-22

## 2024-06-22 NOTE — TELEPHONE ENCOUNTER
From: Zach Aguilera  Sent: 6/22/2024 9:02 AM CDT  To: Em Triage Support  Subject: Medication list    Please do not show on my chart ee

## 2024-07-09 ENCOUNTER — OFFICE VISIT (OUTPATIENT)
Dept: SURGERY | Facility: CLINIC | Age: 74
End: 2024-07-09
Payer: MEDICARE

## 2024-07-09 DIAGNOSIS — R97.20 ELEVATED PSA: ICD-10-CM

## 2024-07-09 DIAGNOSIS — R59.1 LYMPHADENOPATHY: Primary | ICD-10-CM

## 2024-07-09 DIAGNOSIS — N40.1 BENIGN PROSTATIC HYPERPLASIA WITH NOCTURIA: ICD-10-CM

## 2024-07-09 DIAGNOSIS — R35.1 BENIGN PROSTATIC HYPERPLASIA WITH NOCTURIA: ICD-10-CM

## 2024-07-09 PROCEDURE — 99213 OFFICE O/P EST LOW 20 MIN: CPT | Performed by: UROLOGY

## 2024-07-09 NOTE — PROGRESS NOTES
Zach Aguilera is a 73 year old male.    HPI:     Chief Complaint   Patient presents with    elevated psa    Hematuria     Pt had recent CT scan here discuss those results        73-year-old male who I last saw in the office November 2023 for history of microhematuria elevated but stable serum PSA.    Had a cystoscopy at last visit that did not show any significant findings.  Follow-up urinalysis performed in late June of this year by his primary care physician showed no evidence of bleeding.  I had asked the patient to obtain a CT urogram that he did not do until Jessica 10, 2024.  This demonstrates incidental pelvic mild lymphadenopathy.  There is evidence of BPH.  Recommendations made by me to have the patient see me in follow-up to discuss after my vacation.  He was seen by my partner Dr. Wilkerson in my absence and she ordered a percutaneous image guided biopsy of his pelvic lymph nodes based on discussions with him.  He is scheduled for this on July 11, 2024.    Has had a chronic elevated but stable serum PSA most recently 5.2 8 June 2024 improved compared to previous PSAs.  Had MRI of the cervical prostate x 2 most recently December 2022 showing no significant abnormalities.  There is no known family history of prostate cancer.      HISTORY:  Past Medical History:    Anxiety    Depression    Hypercholesteremia    Insulin resistance    Obesity (BMI 30-39.9)    Obesity (BMI 30-39.9)    HEYDI on CPAP    Primary osteoarthritis of both knees    Primary osteoarthritis of both knees    RLS (restless legs syndrome)    Sleep apnea    Weight gain      Past Surgical History:   Procedure Laterality Date    Tonsillectomy        Family History   Problem Relation Age of Onset    Heart Disorder Father     Other (alzhiemers) Mother       Social History:   Social History     Socioeconomic History    Marital status:    Tobacco Use    Smoking status: Former    Smokeless tobacco: Never    Tobacco comments:     Quit 30 years ago     Vaping Use    Vaping status: Never Used   Substance and Sexual Activity    Alcohol use: Yes     Alcohol/week: 3.0 standard drinks of alcohol     Types: 3 Standard drinks or equivalent per week    Drug use: Never   Other Topics Concern     Service Yes    Blood Transfusions No    Caffeine Concern Yes     Comment: coffee, soda, tea, energy drinks    Occupational Exposure No    Hobby Hazards No    Sleep Concern No    Stress Concern No    Weight Concern Yes    Special Diet No    Back Care No    Exercise No    Bike Helmet No    Seat Belt Yes    Self-Exams Yes        Medications (Active prior to today's visit):  Current Outpatient Medications   Medication Sig Dispense Refill    FLUoxetine 10 MG Oral Tab Take 1 tablet (10 mg total) by mouth daily. 90 tablet 1    omeprazole 20 MG Oral Capsule Delayed Release Take 1 capsule (20 mg total) by mouth daily. 90 capsule 1    buPROPion  MG Oral Tablet 24 Hr Take 1 tablet (300 mg total) by mouth daily. 90 tablet 1    atorvastatin 10 MG Oral Tab Take 1 tablet (10 mg total) by mouth daily. 90 tablet 3    rOPINIRole 0.5 MG Oral Tab Take 1 tablet (0.5 mg total) by mouth daily as needed.      LORazepam 0.5 MG Oral Tab Take 1 tablet (0.5 mg total) by mouth daily as needed for Anxiety.      hydroCHLOROthiazide 12.5 MG Oral Cap Take 1 capsule (12.5 mg total) by mouth daily. 90 capsule 1    tamsulosin 0.4 MG Oral Cap Take 1 capsule (0.4 mg total) by mouth daily. 90 capsule 3    naltrexone 50 MG Oral Tab naltrexone 50 mg tablet   TAKE 1 TABLET BY MOUTH EVERY DAY      Diclofenac Sodium 1 % External Gel Apply 2 g topically 4 (four) times daily. 1 each 0       Allergies:  No Known Allergies      ROS:       PHYSICAL EXAM:        ASSESSMENT/PLAN:   Assessment   Encounter Diagnoses   Name Primary?    Lymphadenopathy Yes    Elevated PSA     Benign prostatic hyperplasia with nocturia        Reviewed findings at length with patient.  Agreed with decision to proceed with percutaneous  biopsy of pelvic lymph nodes.  Further instructions and plan based on results.  We agreed that he would follow-up to see me otherwise in 6 months.         Orders This Visit:  No orders of the defined types were placed in this encounter.      Meds This Visit:  Requested Prescriptions      No prescriptions requested or ordered in this encounter       Imaging & Referrals:  None     7/9/2024  Femi Martinez MD

## 2024-07-11 ENCOUNTER — PATIENT MESSAGE (OUTPATIENT)
Dept: SURGERY | Facility: CLINIC | Age: 74
End: 2024-07-11

## 2024-07-11 ENCOUNTER — TELEPHONE (OUTPATIENT)
Dept: INTERNAL MEDICINE CLINIC | Facility: CLINIC | Age: 74
End: 2024-07-11

## 2024-07-11 ENCOUNTER — HOSPITAL ENCOUNTER (OUTPATIENT)
Dept: CT IMAGING | Facility: HOSPITAL | Age: 74
Discharge: HOME | End: 2024-07-11
Attending: INTERNAL MEDICINE
Payer: MEDICARE

## 2024-07-11 VITALS
DIASTOLIC BLOOD PRESSURE: 72 MMHG | BODY MASS INDEX: 28.56 KG/M2 | WEIGHT: 204 LBS | RESPIRATION RATE: 19 BRPM | HEART RATE: 63 BPM | SYSTOLIC BLOOD PRESSURE: 149 MMHG | HEIGHT: 71 IN | OXYGEN SATURATION: 96 %

## 2024-07-11 DIAGNOSIS — R59.1 LYMPHADENOPATHY: ICD-10-CM

## 2024-07-11 LAB
DEPRECATED RDW RBC AUTO: 45.1 FL (ref 35.1–46.3)
ERYTHROCYTE [DISTWIDTH] IN BLOOD BY AUTOMATED COUNT: 13.9 % (ref 11–15)
HCT VFR BLD AUTO: 42.6 %
HGB BLD-MCNC: 14.9 G/DL
MCH RBC QN AUTO: 31.1 PG (ref 26–34)
MCHC RBC AUTO-ENTMCNC: 35 G/DL (ref 31–37)
MCV RBC AUTO: 88.9 FL
PLATELET # BLD AUTO: 243 10(3)UL (ref 150–450)
RBC # BLD AUTO: 4.79 X10(6)UL
WBC # BLD AUTO: 5.1 X10(3) UL (ref 4–11)

## 2024-07-11 PROCEDURE — 36415 COLL VENOUS BLD VENIPUNCTURE: CPT | Performed by: STUDENT IN AN ORGANIZED HEALTH CARE EDUCATION/TRAINING PROGRAM

## 2024-07-11 PROCEDURE — 85027 COMPLETE CBC AUTOMATED: CPT | Performed by: STUDENT IN AN ORGANIZED HEALTH CARE EDUCATION/TRAINING PROGRAM

## 2024-07-11 RX ORDER — NALOXONE HYDROCHLORIDE 0.4 MG/ML
80 INJECTION, SOLUTION INTRAMUSCULAR; INTRAVENOUS; SUBCUTANEOUS AS NEEDED
Status: DISCONTINUED | OUTPATIENT
Start: 2024-07-11 | End: 2024-07-13

## 2024-07-11 RX ORDER — MIDAZOLAM HYDROCHLORIDE 1 MG/ML
1 INJECTION INTRAMUSCULAR; INTRAVENOUS EVERY 5 MIN PRN
Status: DISCONTINUED | OUTPATIENT
Start: 2024-07-11 | End: 2024-07-13

## 2024-07-11 RX ORDER — SODIUM CHLORIDE 9 MG/ML
INJECTION, SOLUTION INTRAVENOUS CONTINUOUS
Status: DISCONTINUED | OUTPATIENT
Start: 2024-07-11 | End: 2024-07-13

## 2024-07-11 RX ORDER — FLUMAZENIL 0.1 MG/ML
0.2 INJECTION INTRAVENOUS AS NEEDED
Status: DISCONTINUED | OUTPATIENT
Start: 2024-07-11 | End: 2024-07-13

## 2024-07-11 RX ORDER — MIDAZOLAM HYDROCHLORIDE 1 MG/ML
INJECTION INTRAMUSCULAR; INTRAVENOUS
Status: DISCONTINUED
Start: 2024-07-11 | End: 2024-07-11 | Stop reason: WASHOUT

## 2024-07-11 NOTE — TELEPHONE ENCOUNTER
Received fax from [facility] requesting history and physical within 30 days of procedure. Originally it was scheduled 7/11 at 10am. It seems to be canceled. I contacted patient, no answer. I left message to call back.  If patient calls back please make appt for pre-op with pcp or bruce garner. Only if patient is rescheduling procedure .

## 2024-07-11 NOTE — PROGRESS NOTES
Interventional Radiology  Progress Note    Patient with benign prostatic hyperplasia and elevated PSA, followed by Dr. Femi Martinez. He came to IR today for biopsy of indeterminate pelvic lymph nodes.    I discussed risks and benefits with the procedure. After consideration, patient stated that he does not want to proceed with biopsy.    aSeid Weathers MD  7/11/2024  Interventional Radiology  [facility]

## 2024-07-15 NOTE — TELEPHONE ENCOUNTER
From: Zach Aguilera  To: Femi Martinez  Sent: 7/11/2024 1:17 PM CDT  Subject: Request for Urogram film    I would like to request a copy of my recent Urogram on disc. Please let me know if you need any additional information and if I need to do anything else.    Zach Aguilera

## 2024-07-16 ENCOUNTER — OFFICE VISIT (OUTPATIENT)
Facility: CLINIC | Age: 74
End: 2024-07-16
Payer: MEDICARE

## 2024-07-16 VITALS
SYSTOLIC BLOOD PRESSURE: 118 MMHG | HEART RATE: 68 BPM | OXYGEN SATURATION: 98 % | BODY MASS INDEX: 28.7 KG/M2 | DIASTOLIC BLOOD PRESSURE: 64 MMHG | HEIGHT: 71 IN | WEIGHT: 205 LBS

## 2024-07-16 DIAGNOSIS — K59.00 CONSTIPATION, UNSPECIFIED CONSTIPATION TYPE: ICD-10-CM

## 2024-07-16 DIAGNOSIS — R59.0 PELVIC LYMPHADENOPATHY: Primary | ICD-10-CM

## 2024-07-16 DIAGNOSIS — F41.9 ANXIETY: ICD-10-CM

## 2024-07-16 PROCEDURE — G2211 COMPLEX E/M VISIT ADD ON: HCPCS | Performed by: INTERNAL MEDICINE

## 2024-07-16 PROCEDURE — 99214 OFFICE O/P EST MOD 30 MIN: CPT | Performed by: INTERNAL MEDICINE

## 2024-07-16 RX ORDER — TIRZEPATIDE 2.5 MG/.5ML
INJECTION, SOLUTION SUBCUTANEOUS
COMMUNITY
Start: 2024-06-20

## 2024-07-16 RX ORDER — POLYETHYLENE GLYCOL 3350 17 G/17G
17 POWDER, FOR SOLUTION ORAL DAILY
Qty: 30 EACH | Refills: 0 | Status: SHIPPED | OUTPATIENT
Start: 2024-07-16

## 2024-07-16 NOTE — PROGRESS NOTES
Zach Aguilera is a 73 year old male.  Chief Complaint   Patient presents with    Follow - Up     Would like to follow up on pre existing conditions, would like to discuss medications, and would like help managing anxiety     HPI:   73-year-old gentleman here for follow-up.  He reports that he is doing okay except having constipation and on and off flareup of anxiety.  He CT urogram showed nonspecific lymphadenopathy.  He was scheduled for biopsy but decided not to proceed as risk outweighs the benefit.  He is going to get another opinion from [facility].  He also reports that he gets constipation with weight loss medications.  Denies any chest pain or shortness of breath.  He      Current Outpatient Medications   Medication Sig Dispense Refill    MOUNJARO 2.5 MG/0.5ML Subcutaneous Solution Pen-injector ADMINISTER 2.5 MG UNDER THE SKIN ONCE EVERY WEEK      polyethylene glycol, PEG 3350, (MIRALAX) 17 GM/SCOOP Oral Powder Take 17 g by mouth daily. 30 each 0    FLUoxetine 10 MG Oral Tab Take 1 tablet (10 mg total) by mouth daily. 90 tablet 1    omeprazole 20 MG Oral Capsule Delayed Release Take 1 capsule (20 mg total) by mouth daily. 90 capsule 1    buPROPion  MG Oral Tablet 24 Hr Take 1 tablet (300 mg total) by mouth daily. 90 tablet 1    atorvastatin 10 MG Oral Tab Take 1 tablet (10 mg total) by mouth daily. 90 tablet 3    hydroCHLOROthiazide 12.5 MG Oral Cap Take 1 capsule (12.5 mg total) by mouth daily. 90 capsule 1    LORazepam 0.5 MG Oral Tab Take 1 tablet (0.5 mg total) by mouth daily as needed for Anxiety. (Patient not taking: Reported on 7/16/2024)      Diclofenac Sodium 1 % External Gel Apply 2 g topically 4 (four) times daily. (Patient not taking: Reported on 7/16/2024) 1 each 0      Past Medical History:    Anxiety    Depression    Esophageal reflux    Hearing impairment    High cholesterol    Hypercholesteremia    Insulin resistance    Obesity (BMI 30-39.9)    Obesity (BMI 30-39.9)    HEYDI on  CPAP    Osteoarthritis    Primary osteoarthritis of both knees    Primary osteoarthritis of both knees    RLS (restless legs syndrome)    Sleep apnea    Weight gain      Past Surgical History:   Procedure Laterality Date    Colonoscopy      Tonsillectomy      Upper gi endoscopy,exam        Social History:  Social History     Socioeconomic History    Marital status:    Tobacco Use    Smoking status: Former    Smokeless tobacco: Never    Tobacco comments:     Quit 30 years ago    Vaping Use    Vaping status: Never Used   Substance and Sexual Activity    Alcohol use: Yes     Alcohol/week: 7.0 standard drinks of alcohol     Types: 7 Standard drinks or equivalent per week    Drug use: Never   Other Topics Concern     Service Yes    Blood Transfusions No    Caffeine Concern Yes     Comment: coffee, soda, tea, energy drinks    Occupational Exposure No    Hobby Hazards No    Sleep Concern No    Stress Concern No    Weight Concern Yes    Special Diet No    Back Care No    Exercise No    Bike Helmet No    Seat Belt Yes    Self-Exams Yes      Family History   Problem Relation Age of Onset    Heart Disorder Father     Other (alzhiemers) Mother       No Known Allergies     REVIEW OF SYSTEMS:   Review of Systems   Review of Systems   Constitutional: Negative for activity change, appetite change and fever.   HENT: Negative for congestion and voice change.    Respiratory: Negative for cough and shortness of breath.    Cardiovascular: Negative for chest pain.   Gastrointestinal: Negative for abdominal distention, abdominal pain and vomiting.  Constipation present  Genitourinary: Negative for hematuria.   Skin: Negative for wound.   Psychiatric/Behavioral: Negative for behavioral problems.   Wt Readings from Last 5 Encounters:   07/16/24 205 lb (93 kg)   07/10/24 204 lb (92.5 kg)   06/19/24 206 lb (93.4 kg)   04/18/24 207 lb (93.9 kg)   09/14/23 215 lb (97.5 kg)     Body mass index is 28.59 kg/m².      EXAM:   BP  118/64   Pulse 68   Ht 5' 11\" (1.803 m)   Wt 205 lb (93 kg)   SpO2 98%   BMI 28.59 kg/m²   Physical Exam   Constitutional:       Appearance: Normal appearance.   HENT:      Head: Normocephalic.   Eyes:      Conjunctiva/sclera: Conjunctivae normal.   Cardiovascular:      Rate and Rhythm: Normal rate and regular rhythm.      Heart sounds: Normal heart sounds. No murmur heard.  Pulmonary:      Effort: Pulmonary effort is normal.      Breath sounds: Normal breath sounds. No rhonchi or rales.   Abdominal:      General: Bowel sounds are normal.      Palpations: Abdomen is soft.      Tenderness: There is no abdominal tenderness.   Musculoskeletal:      Cervical back: Neck supple.      Right lower leg: No edema.      Left lower leg: No edema.   Skin:     General: Skin is warm and dry.   Neurological:      General: No focal deficit present.      Mental Status: He is alert and oriented to person, place, and time. Mental status is at baseline.   Psychiatric:         Mood and Affect: Mood normal.         Behavior: Behavior normal.       ASSESSMENT AND PLAN:   1. Pelvic lymphadenopathy  I have reviewed the CT urogram report with him.  I agree with getting another opinion.  He will find out from [facility] regarding the timing of repeat imaging.    2. Anxiety  Continue with current medical regimen.  Takes lorazepam on an as-needed basis.    3. Constipation, unspecified constipation type  High-fiber diet discussed.  MiraLAX powder given.  [facility]    Plan: As above.  Medication prescribed.  He will follow-up with [facility].      The patient indicates understanding of these issues and agrees to the plan.  No follow-ups on file.    This note was prepared using Dragon Medical voice recognition dictation software. As a result errors may occur. When identified these errors have been corrected. While every attempt is made to correct errors during dictation discrepancies may still exist.

## 2024-08-06 DIAGNOSIS — F32.5 MAJOR DEPRESSIVE DISORDER IN FULL REMISSION, UNSPECIFIED WHETHER RECURRENT (HCC): ICD-10-CM

## 2024-08-10 RX ORDER — BUPROPION HYDROCHLORIDE 300 MG/1
300 TABLET ORAL DAILY
Qty: 90 TABLET | Refills: 3 | Status: SHIPPED | OUTPATIENT
Start: 2024-08-10

## 2024-08-10 NOTE — TELEPHONE ENCOUNTER
Refill passed per [facility] protocol.     Requested Prescriptions   Pending Prescriptions Disp Refills    BUPROPION  MG Oral Tablet 24 Hr [Pharmacy Med Name: BUPROPION XL 300MG TABLETS] 90 tablet 1     Sig: TAKE 1 TABLET(300 MG) BY MOUTH DAILY       Psychiatric Non-Scheduled (Anti-Anxiety) Passed - 8/6/2024  1:01 PM        Passed - In person appointment or virtual visit in the past 6 mos or appointment in next 3 mos     Recent Outpatient Visits              3 weeks ago Pelvic lymphadenopathy    [facility] Leon Nicholas MD    Office Visit    1 month ago Lymphadenopathy    [facility] Femi Martinez MD    Office Visit    1 month ago Asymmetrical hearing loss    [facility] Yasemin Ang MD    Office Visit    1 month ago Lymphadenopathy    [facility] Patience Wilkerson MD    Office Visit    3 months ago Dyslipidemia    [facility] Leon Nicholas MD    Office Visit          Future Appointments         Provider Department Appt Notes    In 2 weeks [facility] MRI RM2 (3T WIDE) [facility] MRI with glucose POC if needed    In 2 months Leon Nicholas MD [facility] Follow up                    Passed - Depression Screening completed within the past 12 months

## 2024-08-22 ENCOUNTER — TELEPHONE (OUTPATIENT)
Dept: PULMONOLOGY | Facility: CLINIC | Age: 74
End: 2024-08-22

## 2024-08-22 DIAGNOSIS — R22.1 NECK MASS: Primary | ICD-10-CM

## 2024-08-22 NOTE — TELEPHONE ENCOUNTER
Received a fax from [facility] [employer]. Patient has not been seen since 2022. [employer] Message sent to patient to schedule appointment at earliest convenience via [employer] or by reaching our office at #[phone].

## 2024-08-24 ENCOUNTER — HOSPITAL ENCOUNTER (OUTPATIENT)
Dept: MRI IMAGING | Facility: HOSPITAL | Age: 74
Discharge: HOME | End: 2024-08-24
Attending: SPECIALIST
Payer: MEDICARE

## 2024-08-24 DIAGNOSIS — H91.92 PROGRESSIVE HEARING LOSS OF LEFT EAR: ICD-10-CM

## 2024-08-24 DIAGNOSIS — H91.8X3 ASYMMETRICAL HEARING LOSS: ICD-10-CM

## 2024-08-24 PROCEDURE — 70553 MRI BRAIN STEM W/O & W/DYE: CPT | Performed by: SPECIALIST

## 2024-08-24 PROCEDURE — A9575 INJ GADOTERATE MEGLUMI 0.1ML: HCPCS | Performed by: SPECIALIST

## 2024-08-24 RX ORDER — GADOTERATE MEGLUMINE 376.9 MG/ML
20 INJECTION INTRAVENOUS
Status: COMPLETED | OUTPATIENT
Start: 2024-08-24 | End: 2024-08-24

## 2024-08-24 RX ADMIN — GADOTERATE MEGLUMINE 20 ML: 376.9 INJECTION INTRAVENOUS at 14:40:00

## 2024-08-26 NOTE — PROGRESS NOTES
Negative for retrocochlear disease.  Patient to follow up to recheck suboccipital area.  Maxillary sinus cysts are of no concern.  Patient is aware.

## 2024-09-25 ENCOUNTER — MED REC SCAN ONLY (OUTPATIENT)
Dept: INTERNAL MEDICINE CLINIC | Facility: CLINIC | Age: 74
End: 2024-09-25

## 2024-10-18 ENCOUNTER — OFFICE VISIT (OUTPATIENT)
Facility: CLINIC | Age: 74
End: 2024-10-18
Payer: MEDICARE

## 2024-10-18 VITALS
OXYGEN SATURATION: 97 % | HEIGHT: 71 IN | DIASTOLIC BLOOD PRESSURE: 74 MMHG | WEIGHT: 209 LBS | BODY MASS INDEX: 29.26 KG/M2 | HEART RATE: 66 BPM | SYSTOLIC BLOOD PRESSURE: 132 MMHG

## 2024-10-18 DIAGNOSIS — R59.0 PELVIC LYMPHADENOPATHY: ICD-10-CM

## 2024-10-18 DIAGNOSIS — J20.8 VIRAL BRONCHITIS: Primary | ICD-10-CM

## 2024-10-18 DIAGNOSIS — K59.00 CONSTIPATION, UNSPECIFIED CONSTIPATION TYPE: ICD-10-CM

## 2024-10-18 PROCEDURE — G2211 COMPLEX E/M VISIT ADD ON: HCPCS | Performed by: INTERNAL MEDICINE

## 2024-10-18 PROCEDURE — 99214 OFFICE O/P EST MOD 30 MIN: CPT | Performed by: INTERNAL MEDICINE

## 2024-10-18 RX ORDER — BENZONATATE 200 MG/1
200 CAPSULE ORAL 3 TIMES DAILY PRN
Qty: 30 CAPSULE | Refills: 0 | Status: SHIPPED | OUTPATIENT
Start: 2024-10-18 | End: 2024-10-28

## 2024-10-18 RX ORDER — LEMBOREXANT 10 MG/1
TABLET, FILM COATED ORAL
COMMUNITY

## 2024-10-18 RX ORDER — BUDESONIDE 90 UG/1
1 AEROSOL, POWDER RESPIRATORY (INHALATION) 2 TIMES DAILY
Qty: 1 EACH | Refills: 0 | Status: SHIPPED | OUTPATIENT
Start: 2024-10-18 | End: 2024-11-17

## 2024-10-18 NOTE — PROGRESS NOTES
Zach Aguilera is a 73 year old male.  Chief Complaint   Patient presents with    Follow - Up    Cough     Has been having a cough that is starting to affect his sleep    Constipation     HPI:   73-year-old gentleman here for evaluation of cough, constipation and to discuss regarding his concern of repeat imaging for lymphadenopathy.  He reports that he has cough for 1 week duration.  Denies any fever or chills.  Secretions are clear.  When he was taking Mounjaro he was constipated.  Currently he is off medication.  Denies any blood in the stool or blood in the urine.      Current Outpatient Medications   Medication Sig Dispense Refill    DAYVIGO 10 MG Oral Tab Take by oral route for 30 days.      benzonatate 200 MG Oral Cap Take 1 capsule (200 mg total) by mouth 3 (three) times daily as needed for cough. 30 capsule 0    Budesonide (PULMICORT FLEXHALER) 90 MCG/ACT Inhalation Aerosol Powder, Breath Activated Inhale 1 puff into the lungs 2 (two) times daily. 1 each 0    buPROPion  MG Oral Tablet 24 Hr Take 1 tablet (300 mg total) by mouth daily. 90 tablet 3    polyethylene glycol, PEG 3350, (MIRALAX) 17 GM/SCOOP Oral Powder Take 17 g by mouth daily. 30 each 0    FLUoxetine 10 MG Oral Tab Take 1 tablet (10 mg total) by mouth daily. 90 tablet 1    omeprazole 20 MG Oral Capsule Delayed Release Take 1 capsule (20 mg total) by mouth daily. 90 capsule 1    atorvastatin 10 MG Oral Tab Take 1 tablet (10 mg total) by mouth daily. 90 tablet 3    LORazepam 0.5 MG Oral Tab Take 1 tablet (0.5 mg total) by mouth daily as needed for Anxiety.      Diclofenac Sodium 1 % External Gel Apply 2 g topically 4 (four) times daily. 1 each 0    MOUNJARO 2.5 MG/0.5ML Subcutaneous Solution Pen-injector ADMINISTER 2.5 MG UNDER THE SKIN ONCE EVERY WEEK (Patient not taking: Reported on 10/18/2024)        Past Medical History:    Anxiety    Depression    Esophageal reflux    Hearing impairment    High cholesterol    Hypercholesteremia     Insulin resistance    Obesity (BMI 30-39.9)    Obesity (BMI 30-39.9)    HEYDI on CPAP    Osteoarthritis    Primary osteoarthritis of both knees    Primary osteoarthritis of both knees    RLS (restless legs syndrome)    Sleep apnea    Weight gain      Past Surgical History:   Procedure Laterality Date    Colonoscopy      Tonsillectomy      Upper gi endoscopy,exam        Social History:  Social History     Socioeconomic History    Marital status:    Tobacco Use    Smoking status: Former    Smokeless tobacco: Never    Tobacco comments:     Quit 30 years ago    Vaping Use    Vaping status: Never Used   Substance and Sexual Activity    Alcohol use: Yes     Alcohol/week: 7.0 standard drinks of alcohol     Types: 7 Standard drinks or equivalent per week    Drug use: Never   Other Topics Concern     Service Yes    Blood Transfusions No    Caffeine Concern Yes     Comment: coffee, soda, tea, energy drinks    Occupational Exposure No    Hobby Hazards No    Sleep Concern No    Stress Concern No    Weight Concern Yes    Special Diet No    Back Care No    Exercise No    Bike Helmet No    Seat Belt Yes    Self-Exams Yes      Family History   Problem Relation Age of Onset    Heart Disorder Father     Other (alzhiemers) Mother       Allergies[1]     REVIEW OF SYSTEMS:   Review of Systems   Review of Systems   Constitutional: Negative for activity change, appetite change and fever.   Cough present  Cardiovascular: Negative for chest pain.   Gastrointestinal: Negative for abdominal distention, abdominal pain and vomiting.   Genitourinary: Negative for hematuria.   Skin: Negative for wound.   Psychiatric/Behavioral: Negative for behavioral problems.   Wt Readings from Last 5 Encounters:   10/18/24 209 lb (94.8 kg)   07/16/24 205 lb (93 kg)   07/10/24 204 lb (92.5 kg)   06/19/24 206 lb (93.4 kg)   04/18/24 207 lb (93.9 kg)     Body mass index is 29.15 kg/m².      EXAM:   /74   Pulse 66   Ht 5' 11\" (1.803 m)   Wt  209 lb (94.8 kg)   SpO2 97%   BMI 29.15 kg/m²   Physical Exam   Constitutional:       Appearance: Normal appearance.   HENT:      Head: Normocephalic.   Eyes:      Conjunctiva/sclera: Conjunctivae normal.   Cardiovascular:      Rate and Rhythm: Normal rate and regular rhythm.      Heart sounds: Normal heart sounds. No murmur heard.  Pulmonary:      Effort: Pulmonary effort is normal.      Breath sounds: Normal breath sounds. No rhonchi or rales.   Abdominal:      General: Bowel sounds are normal.      Palpations: Abdomen is soft.      Tenderness: There is no abdominal tenderness.   Musculoskeletal:      Cervical back: Neck supple.      Right lower leg: No edema.      Left lower leg: No edema.   Skin:     General: Skin is warm and dry.   Neurological:      General: No focal deficit present.      Mental Status: He is alert and oriented to person, place, and time. Mental status is at baseline.   Psychiatric:         Mood and Affect: Mood normal.         Behavior: Behavior normal.       ASSESSMENT AND PLAN:   1. Viral bronchitis  Will treat conservatively with a steam inhalation, benzonatate for cough.  Provided with steroid inhaler as well.  If no improvement, to proceed with x-ray.  - XR CHEST PA + LAT CHEST (CPT=71046); Future    2. Pelvic lymphadenopathy  Lengthy discussion with the patient regarding the importance of follow-up.  He prefers to follow-up with [facility].  If it is possible, he would likely repeat CT scan at [place].  He will discuss that with his [facility] urologist and he will inform me.    3. Constipation, unspecified constipation type  Continue with a high-fiber diet.  Continue MiraLAX as needed.  Currently he is off Mounjaro.    Plan: Medication prescribed.  X-ray ordered.  I will see him back in 4 months      The patient indicates understanding of these issues and agrees to the plan.  No follow-ups on file.    This note was prepared using Dragon Medical voice recognition dictation software.  As a result errors may occur. When identified these errors have been corrected. While every attempt is made to correct errors during dictation discrepancies may still exist.       [1] No Known Allergies

## 2024-10-21 ENCOUNTER — OFFICE VISIT (OUTPATIENT)
Dept: OTOLARYNGOLOGY | Facility: CLINIC | Age: 74
End: 2024-10-21
Payer: MEDICARE

## 2024-10-21 VITALS — WEIGHT: 209 LBS | BODY MASS INDEX: 29.26 KG/M2 | HEIGHT: 71 IN

## 2024-10-21 DIAGNOSIS — H91.8X3 ASYMMETRICAL HEARING LOSS: ICD-10-CM

## 2024-10-21 DIAGNOSIS — R22.1 NECK MASS: Primary | ICD-10-CM

## 2024-10-21 PROCEDURE — 99213 OFFICE O/P EST LOW 20 MIN: CPT | Performed by: SPECIALIST

## 2024-10-22 ENCOUNTER — TELEPHONE (OUTPATIENT)
Dept: INTERNAL MEDICINE CLINIC | Facility: CLINIC | Age: 74
End: 2024-10-22

## 2024-10-22 NOTE — PATIENT INSTRUCTIONS
Your MRI did not show any evidence of retrocochlear disease.  There was a left suboccipital mass which does not look to be concerning.  It was in the subcutaneous tissue.  An ultrasound was ordered so that we can better follow this for you.

## 2024-10-22 NOTE — PROGRESS NOTES
Zach Aguilera is a 73 year old male.   Chief Complaint   Patient presents with    Follow - Up     Patient here for hearing loss f/up with MRI results.     HPI:   Patient here for follow-up and to discuss his MRI results.    Current Outpatient Medications   Medication Sig Dispense Refill    DAYVIGO 10 MG Oral Tab Take by oral route for 30 days.      benzonatate 200 MG Oral Cap Take 1 capsule (200 mg total) by mouth 3 (three) times daily as needed for cough. 30 capsule 0    Budesonide (PULMICORT FLEXHALER) 90 MCG/ACT Inhalation Aerosol Powder, Breath Activated Inhale 1 puff into the lungs 2 (two) times daily. 1 each 0    buPROPion  MG Oral Tablet 24 Hr Take 1 tablet (300 mg total) by mouth daily. 90 tablet 3    polyethylene glycol, PEG 3350, (MIRALAX) 17 GM/SCOOP Oral Powder Take 17 g by mouth daily. 30 each 0    FLUoxetine 10 MG Oral Tab Take 1 tablet (10 mg total) by mouth daily. 90 tablet 1    omeprazole 20 MG Oral Capsule Delayed Release Take 1 capsule (20 mg total) by mouth daily. 90 capsule 1    atorvastatin 10 MG Oral Tab Take 1 tablet (10 mg total) by mouth daily. 90 tablet 3    LORazepam 0.5 MG Oral Tab Take 1 tablet (0.5 mg total) by mouth daily as needed for Anxiety.      Diclofenac Sodium 1 % External Gel Apply 2 g topically 4 (four) times daily. 1 each 0    MOUNJARO 2.5 MG/0.5ML Subcutaneous Solution Pen-injector ADMINISTER 2.5 MG UNDER THE SKIN ONCE EVERY WEEK (Patient not taking: Reported on 10/21/2024)        Past Medical History:    Anxiety    Depression    Esophageal reflux    Hearing impairment    High cholesterol    Hypercholesteremia    Insulin resistance    Obesity (BMI 30-39.9)    Obesity (BMI 30-39.9)    HEYDI on CPAP    Osteoarthritis    Primary osteoarthritis of both knees    Primary osteoarthritis of both knees    RLS (restless legs syndrome)    Sleep apnea    Weight gain      Social History:  Social History     Socioeconomic History    Marital status:    Tobacco Use    Smoking  status: Former    Smokeless tobacco: Never    Tobacco comments:     Quit 30 years ago    Vaping Use    Vaping status: Never Used   Substance and Sexual Activity    Alcohol use: Yes     Alcohol/week: 7.0 standard drinks of alcohol     Types: 7 Standard drinks or equivalent per week    Drug use: Never   Other Topics Concern     Service Yes    Blood Transfusions No    Caffeine Concern Yes     Comment: coffee, soda, tea, energy drinks    Occupational Exposure No    Hobby Hazards No    Sleep Concern No    Stress Concern No    Weight Concern Yes    Special Diet No    Back Care No    Exercise No    Bike Helmet No    Seat Belt Yes    Self-Exams Yes        REVIEW OF SYSTEMS:   GENERAL HEALTH: feels well otherwise  GENERAL : denies fever, chills, sweats, weight loss, weight gain  SKIN: denies any unusual skin lesions or rashes  RESPIRATORY: denies shortness of breath with exertion  NEURO: denies headaches    EXAM:   Ht 5' 11\" (1.803 m)   Wt 209 lb (94.8 kg)   BMI 29.15 kg/m²   System Details   Skin Inspection - Normal.   Constitutional Overall appearance - Normal.   Head/Face Facial features - Normal. Eyebrows - Normal. Skull - Normal.   Eyes Conjunctiva - Right: Normal, Left: Normal. Pupil - Right: Normal, Left: Normal.    Ears Inspection - Right: Normal, Left: Normal.   Canal - Right: Normal, Left: Normal.   TM - Right: Normal, Left: Normal.   Nasal External nose - Normal.   Nasal septum - Normal.  Turbinates - Normal.   Oral/Oropharynx Lips - Normal, Tonsils - Normal, Tongue - Normal    Neck Exam Inspection - Normal. Palpation - Normal. Parotid gland - Normal. Thyroid gland - Normal.  1.5 x 1 cm left subcutaneous suboccipital mass.   Lymph Detail Submental. Submandibular. Anterior cervical. Posterior cervical. Supraclavicular all without enlargement   Psychiatric Orientation - Oriented to time, place, person & situation. Appropriate mood and affect.   Neurological Memory - Normal. Cranial nerves - Cranial nerves  II through XII grossly intact.     ASSESSMENT AND PLAN:   1. Neck mass  As above.  We reviewed the MRI films together.  An ultrasound will be ordered as this will be an easier route to follow the mass.  Differential diagnosis on the MRI was a borderline enlarged lymph node versus a venal lymphatic malformation  - US HEAD/NECK (CPT=76536); Future    2. Asymmetrical hearing loss  On the left.  MRI negative for retrocochlear disease.      The patient indicates understanding of these issues and agrees to the plan.      Yasemin Ang MD  10/21/2024  8:30 PM

## 2024-11-01 ENCOUNTER — HOSPITAL ENCOUNTER (OUTPATIENT)
Dept: ULTRASOUND IMAGING | Age: 74
Discharge: HOME | End: 2024-11-01
Attending: SPECIALIST
Payer: MEDICARE

## 2024-11-01 DIAGNOSIS — R22.1 NECK MASS: ICD-10-CM

## 2024-11-01 PROCEDURE — 76536 US EXAM OF HEAD AND NECK: CPT | Performed by: SPECIALIST

## 2024-11-01 NOTE — PROGRESS NOTES
I left a message for the radiologist asking him to explain why this may not have been seen on ultrasound.  I was hoping to follow the lesion by this modality.

## 2024-11-15 NOTE — TELEPHONE ENCOUNTER
Left a message for the patient.  We should be able to see on an ultrasound the mass found on the MRI. Will repeat the ultrasound.

## 2024-11-27 ENCOUNTER — HOSPITAL ENCOUNTER (OUTPATIENT)
Dept: GENERAL RADIOLOGY | Age: 74
Discharge: HOME | End: 2024-11-27
Attending: INTERNAL MEDICINE
Payer: MEDICARE

## 2024-11-27 ENCOUNTER — TELEPHONE (OUTPATIENT)
Dept: INTERNAL MEDICINE CLINIC | Facility: CLINIC | Age: 74
End: 2024-11-27

## 2024-11-27 DIAGNOSIS — R05.9 COUGH, UNSPECIFIED TYPE: ICD-10-CM

## 2024-11-27 DIAGNOSIS — R05.9 COUGH, UNSPECIFIED TYPE: Primary | ICD-10-CM

## 2024-11-27 PROCEDURE — 71046 X-RAY EXAM CHEST 2 VIEWS: CPT | Performed by: INTERNAL MEDICINE

## 2024-11-29 ENCOUNTER — HOSPITAL ENCOUNTER (OUTPATIENT)
Dept: ULTRASOUND IMAGING | Age: 74
Discharge: HOME | End: 2024-11-29
Attending: SPECIALIST
Payer: MEDICARE

## 2024-11-29 DIAGNOSIS — R22.1 NECK MASS: ICD-10-CM

## 2024-11-29 PROCEDURE — 76536 US EXAM OF HEAD AND NECK: CPT | Performed by: SPECIALIST

## 2024-11-29 RX ORDER — BUDESONIDE 90 UG/1
1 AEROSOL, POWDER RESPIRATORY (INHALATION) 2 TIMES DAILY
Qty: 1 EACH | Refills: 0 | Status: SHIPPED | OUTPATIENT
Start: 2024-11-29

## 2024-11-29 NOTE — TELEPHONE ENCOUNTER
Please review; protocol failed.    Requested Prescriptions   Pending Prescriptions Disp Refills    PULMICORT FLEXHALER 90 MCG/ACT Inhalation Aerosol Powder, Breath Activated [Pharmacy Med Name: PULMICORT 90MCG FLEXHALER(60 PUFFS)] 1 each 0     Sig: INHALE 1 PUFF INTO THE LUNGS TWICE DAILY       Asthma & COPD Medication Protocol Failed - 11/29/2024  3:01 PM        Failed - ACT Score greater than or equal to 20                Failed - ACT recorded in the last 12 months                Passed - Appointment in past 6 or next 3 months      Recent Outpatient Visits              1 month ago Neck mass    [facility]    Office Visit    1 month ago Viral bronchitis    [facility], Leon Juarez MD    Office Visit    4 months ago Pelvic lymphadenopathy    [facility], Leon Juarez MD    Office Visit    4 months ago Lymphadenopathy    [facility], Femi Bernardo MD    Office Visit    5 months ago Asymmetrical hearing loss    [facility], Yasemin Holguin MD    Office Visit          Future Appointments         Provider Department Appt Notes    Today [facility] US RM[facility] - [place]

## 2024-11-30 NOTE — PROGRESS NOTES
They are able to visualize on the ultrasound.  This makes it easier for us to follow.  I have asked the radiologist is this favors a vascular mass or lymph node.

## 2024-12-05 ENCOUNTER — MED REC SCAN ONLY (OUTPATIENT)
Dept: INTERNAL MEDICINE CLINIC | Facility: CLINIC | Age: 74
End: 2024-12-05

## 2024-12-17 NOTE — TELEPHONE ENCOUNTER
Please Review. Protocol Failed; No Protocol   No Active/ Future labs pended  Lab Results   Component Value Date     ALT 21 09/07/2023     Component Value Date    CHOLEST 141 09/07/2023    TRIG 216 (H) 09/07/2023    HDL 54 09/07/2023    LDL 53 09/07/2023    VLDL 31 (H) 09/07/2023    NONHDLC 87 09/07/2023   Recent Visits  Date Type Provider Dept   10/18/24 Office Visit Leon Nicholas MD [facility]   07/16/24 Office Visit Leon Nicholas MD [facility]   04/18/24 Office Visit Leon Nicholas MD [facility]   08/31/23 Office Visit Leon Nicholas MD [facility]   07/28/23 Office Visit Maycol Jorge MD [id number]   06/28/23 Office Visit Carolyn Rebolledo APRN [facility]   Showing recent visits within past 540 days with a meds authorizing provider and meeting all other requirements  Future Appointments  Date Type Provider Dept   01/16/25 Appointment Yuliya Roth APRN [facility]   Showing future appointments within next 150 days with a meds authorizing provider and meeting all other requirements          Requested Prescriptions   Pending Prescriptions Disp Refills    ATORVASTATIN 10 MG Oral Tab [Pharmacy Med Name: ATORVASTATIN 10MG TABLETS] 90 tablet 3     Sig: TAKE 1 TABLET(10 MG) BY MOUTH DAILY       Cholesterol Medication Protocol Failed - 12/17/2024  2:23 PM        Failed - ALT < 80     Lab Results   Component Value Date    ALT 21 09/07/2023             Failed - ALT resulted within past year        Failed - Lipid panel within past 12 months     Lab Results   Component Value Date    CHOLEST 141 09/07/2023    TRIG 216 (H) 09/07/2023    HDL 54 09/07/2023    LDL 53 09/07/2023    VLDL 31 (H) 09/07/2023    NONHDLC 87 09/07/2023             Passed - In person appointment or virtual visit in the past 12 mos or appointment in next 3 mos     Recent Outpatient Visits              1 month ago [facility] [place] [facility]    Office Visit    2 months ago Viral bronchitis    [facility], [facility], Leon Juarez MD    Office Visit    5 months ago Pelvic lymphadenopathy    [facility], [facility], Leon Juarez MD    Office Visit    5 months ago Lymphadenopathy    [facility], [facility], Femi Bernardo MD    Office Visit    6 months ago Asymmetrical hearing loss    [facility] [place]    [place] Visit          Future Appointments         Provider Department Appt Notes    In 1 month Yuliya Roth APRN [facility] Follow up on chest xray                           Future Appointments         Provider Department Appt Notes    In 1 month [facility]          Recent Outpatient Visits              1 month ago Neck mass    [facility] [place]    Office Visit    2 months ago Viral bronchitis    [facility], [facility], Leon Juarez MD    Office Visit    5 months ago Pelvic lymphadenopathy    [facility], Leon Juarez MD    Office Visit    5 months ago Lymphadenopathy    [facility], Femi Bernardo MD    Office Visit    6 months ago Asymmetrical hearing loss    [facility], Yasemin Holguin MD    Office Visit

## 2024-12-18 RX ORDER — ATORVASTATIN CALCIUM 10 MG/1
10 TABLET, FILM COATED ORAL DAILY
Qty: 90 TABLET | Refills: 3 | Status: SHIPPED | OUTPATIENT
Start: 2024-12-18

## 2025-01-09 RX ORDER — HYDROCHLOROTHIAZIDE 12.5 MG/1
12.5 CAPSULE ORAL DAILY
Qty: 90 CAPSULE | Refills: 1 | OUTPATIENT
Start: 2025-01-09

## 2025-01-16 ENCOUNTER — OFFICE VISIT (OUTPATIENT)
Dept: INTERNAL MEDICINE CLINIC | Facility: CLINIC | Age: 75
End: 2025-01-16
Payer: MEDICARE

## 2025-01-16 VITALS
WEIGHT: 216 LBS | HEIGHT: 71 IN | HEART RATE: 65 BPM | SYSTOLIC BLOOD PRESSURE: 130 MMHG | DIASTOLIC BLOOD PRESSURE: 78 MMHG | OXYGEN SATURATION: 96 % | BODY MASS INDEX: 30.24 KG/M2

## 2025-01-16 DIAGNOSIS — R05.1 ACUTE COUGH: Primary | ICD-10-CM

## 2025-01-16 PROCEDURE — 99213 OFFICE O/P EST LOW 20 MIN: CPT | Performed by: NURSE PRACTITIONER

## 2025-01-16 RX ORDER — KETOCONAZOLE 20 MG/G
CREAM TOPICAL
COMMUNITY
Start: 2025-01-14

## 2025-01-16 RX ORDER — ALBUTEROL SULFATE 90 UG/1
2 INHALANT RESPIRATORY (INHALATION) EVERY 6 HOURS PRN
Qty: 1 EACH | Refills: 0 | Status: SHIPPED | OUTPATIENT
Start: 2025-01-16

## 2025-01-16 RX ORDER — BENZONATATE 100 MG/1
100 CAPSULE ORAL 3 TIMES DAILY PRN
Qty: 20 CAPSULE | Refills: 0 | Status: SHIPPED | OUTPATIENT
Start: 2025-01-16 | End: 2025-01-23

## 2025-01-16 NOTE — PROGRESS NOTES
HPI:    Patient ID: Zach Aguilera is a 74 year old male.    HPI Cough  74 year old male who I have never met is here to follow up on a chest X-ray.  Last X-ray  mpression   CONCLUSION:  Scattered basilar reticular opacities are likely atelectatic, without further radiographically evident acute intrathoracic process.     He said he did not receive the letter sent.  Minimal Opacities but continues to cough.  Vitals:    01/16/25 1020   BP: 130/78   Pulse: 65      Immunization History   Administered Date(s) Administered    Adult 18-64 FLUZONE Intraderm, Influenza Vaccine,(56565),Split [facility], [facility] 09/24/2012, 10/14/2013    Covid-19 Vaccine Moderna 100 mcg/0.5 ml 01/22/2021, 02/20/2021, 10/15/2021, 04/08/2022    Covid-19 Vaccine Moderna Bivalent 50mcg/0.5mL 12+ years 10/06/2022    FLU VAC High Dose 65 YRS & Older PRSV Free (94723) 10/13/2020, 10/05/2021    Fluvirin, 3 Years & >, Im 10/31/2014    Fluzone Vaccine Medicare () 10/04/2010, 11/09/2018, 10/21/2019, 10/13/2020, 10/05/2021    HEP A 06/24/2011, 04/03/2014    High Dose Fluzone Influenza Vaccine, 65yr+ PF 0.5mL (74477) 10/06/2022    IPV 07/20/2011    Influenza 10/21/2011, 11/03/2015, 11/01/2016, 10/03/2017    Influenza A, H1N1 Vaccine 11/19/2009    Moderna Covid-19 Vaccine 50mcg/0.5ml 12yrs+ 10/03/2023    Pfizer Covid-19 Vaccine 30mcg/0.3ml 12yrs+ 11/22/2024    Pneumococcal (Prevnar 13) 07/10/2015    Pneumococcal Conjugate PCV20 08/31/2023    Pneumovax 23 05/09/2017    TDAP 05/29/2008, 08/01/2019    Typhoid, Oral 06/06/2011    Yellow Fever 06/24/2011    Zoster Vaccine Live (Zostavax) 05/11/2010    Zoster Vaccine Recombinant Adjuvanted (Shingrix) 05/01/2018, 11/09/2018       Past Medical History:    Anxiety    Depression    Esophageal reflux    Hearing impairment    High cholesterol    Hypercholesteremia    Insulin resistance    Obesity (BMI 30-39.9)    Obesity (BMI 30-39.9)    HEYDI on CPAP    Osteoarthritis    Primary osteoarthritis of both knees     Primary osteoarthritis of both knees    RLS (restless legs syndrome)    Sleep apnea    Weight gain      Past Surgical History:   Procedure Laterality Date    Colonoscopy      Tonsillectomy      Upper gi endoscopy,exam        Social History     Socioeconomic History    Marital status:    Tobacco Use    Smoking status: Former    Smokeless tobacco: Never    Tobacco comments:     Quit 30 years ago    Vaping Use    Vaping status: Never Used   Substance and Sexual Activity    Alcohol use: Yes     Alcohol/week: 7.0 standard drinks of alcohol     Types: 7 Standard drinks or equivalent per week    Drug use: Never   Other Topics Concern     Service Yes    Blood Transfusions No    Caffeine Concern Yes     Comment: coffee, soda, tea, energy drinks    Occupational Exposure No    Hobby Hazards No    Sleep Concern No    Stress Concern No    Weight Concern Yes    Special Diet No    Back Care No    Exercise No    Bike Helmet No    Seat Belt Yes    Self-Exams Yes          Review of Systems   Constitutional:  Negative for chills, fatigue and fever.   HENT:  Negative for ear pain, hearing loss, sinus pain, sore throat and trouble swallowing.    Eyes:  Negative for pain and visual disturbance.   Respiratory:  Positive for cough. Negative for chest tightness and shortness of breath.    Cardiovascular:  Negative for chest pain, palpitations and leg swelling.   Gastrointestinal:  Negative for abdominal pain, constipation, diarrhea, nausea and vomiting.   Endocrine: Negative for cold intolerance and heat intolerance.   Genitourinary:  Negative for dysuria and hematuria.   Musculoskeletal:  Negative for back pain and joint swelling.   Skin:  Negative for rash.   Allergic/Immunologic: Negative for environmental allergies.   Neurological:  Negative for weakness, numbness and headaches.   Hematological:  Does not bruise/bleed easily.   Psychiatric/Behavioral:  Negative for dysphoric mood and sleep disturbance. The patient is not  nervous/anxious.               Current Outpatient Medications   Medication Sig Dispense Refill    ketoconazole 2 % External Cream       amoxicillin clavulanate 875-125 MG Oral Tab Take 1 tablet by mouth 2 (two) times daily for 10 days. 20 tablet 0    albuterol 108 (90 Base) MCG/ACT Inhalation Aero Soln Inhale 2 puffs into the lungs every 6 (six) hours as needed for Wheezing (cough). 1 each 0    benzonatate 100 MG Oral Cap Take 1 capsule (100 mg total) by mouth 3 (three) times daily as needed for cough. 20 capsule 0    atorvastatin 10 MG Oral Tab Take 1 tablet (10 mg total) by mouth daily. 90 tablet 3    PULMICORT FLEXHALER 90 MCG/ACT Inhalation Aerosol Powder, Breath Activated INHALE 1 PUFF INTO THE LUNGS TWICE DAILY 1 each 0    buPROPion  MG Oral Tablet 24 Hr Take 1 tablet (300 mg total) by mouth daily. 90 tablet 3    MOUNJARO 2.5 MG/0.5ML Subcutaneous Solution Pen-injector       FLUoxetine 10 MG Oral Tab Take 1 tablet (10 mg total) by mouth daily. 90 tablet 1    omeprazole 20 MG Oral Capsule Delayed Release Take 1 capsule (20 mg total) by mouth daily. 90 capsule 1    LORazepam 0.5 MG Oral Tab Take 1 tablet (0.5 mg total) by mouth daily as needed for Anxiety.      DAYVIGO 10 MG Oral Tab Take by oral route for 30 days.      polyethylene glycol, PEG 3350, (MIRALAX) 17 GM/SCOOP Oral Powder Take 17 g by mouth daily. 30 each 0    Diclofenac Sodium 1 % External Gel Apply 2 g topically 4 (four) times daily. (Patient not taking: Reported on 1/16/2025) 1 each 0     Allergies:Allergies[1]   PHYSICAL EXAM:   Physical Exam  Constitutional:       Appearance: Normal appearance. He is well-developed.   HENT:      Head: Normocephalic.      Right Ear: Tympanic membrane normal.      Left Ear: Tympanic membrane normal.      Nose: Nose normal.      Mouth/Throat:      Mouth: Mucous membranes are moist.      Pharynx: No oropharyngeal exudate or posterior oropharyngeal erythema.   Eyes:      General:         Right eye: No  discharge.         Left eye: No discharge.      Pupils: Pupils are equal, round, and reactive to light.   Cardiovascular:      Rate and Rhythm: Normal rate and regular rhythm.      Heart sounds: Normal heart sounds. No murmur heard.     No friction rub. No gallop.   Pulmonary:      Effort: Pulmonary effort is normal. No respiratory distress.      Breath sounds: Wheezing present. No rhonchi or rales.   Abdominal:      General: Bowel sounds are normal. There is no distension.      Palpations: Abdomen is soft. There is no mass.      Tenderness: There is no abdominal tenderness. There is no right CVA tenderness, left CVA tenderness or guarding.   Musculoskeletal:         General: No tenderness.      Cervical back: Normal range of motion and neck supple. No tenderness.      Right lower leg: No edema.      Left lower leg: No edema.   Lymphadenopathy:      Cervical: No cervical adenopathy.   Skin:     General: Skin is warm and dry.      Findings: No rash.   Neurological:      Mental Status: He is alert and oriented to person, place, and time.      Coordination: Coordination normal.      Gait: Gait normal.   Psychiatric:         Mood and Affect: Mood normal.         Behavior: Behavior normal.         Thought Content: Thought content normal.         Judgment: Judgment normal.       /78 (BP Location: Right arm, Patient Position: Sitting, Cuff Size: adult)   Pulse 65   Ht 5' 11\" (1.803 m)   Wt 216 lb (98 kg)   SpO2 96%   BMI 30.13 kg/m²   Wt Readings from Last 2 Encounters:   01/16/25 216 lb (98 kg)   10/21/24 209 lb (94.8 kg)     Body mass index is 30.13 kg/m².(2)  Lab Results   Component Value Date    WBC 5.1 07/11/2024    RBC 4.79 07/11/2024    HGB 14.9 07/11/2024    HCT 42.6 07/11/2024    MCV 88.9 07/11/2024    MCH 31.1 07/11/2024    MCHC 35.0 07/11/2024    RDW 13.9 07/11/2024    .0 07/11/2024      Lab Results   Component Value Date    GLU 90 06/06/2024    BUN 21 06/06/2024    BUNCREA 16.4 06/06/2024     CREATSERUM 1.28 06/06/2024    ANIONGAP 5 06/06/2024    GFRNAA 62 06/29/2021    GFRAA 72 06/29/2021    CA 9.8 06/06/2024    OSMOCALC 293 06/06/2024    ALKPHO 67 09/07/2023    AST 19 09/07/2023    ALT 21 09/07/2023    BILT 0.8 09/07/2023    TP 7.3 09/07/2023    ALB 3.6 09/07/2023    GLOBULIN 3.7 09/07/2023     06/06/2024    K 3.5 06/06/2024     06/06/2024    CO2 29.0 06/06/2024      Lab Results   Component Value Date     09/06/2022    A1C 5.8 (H) 09/06/2022      Lab Results   Component Value Date    CHOLEST 141 09/07/2023    TRIG 216 (H) 09/07/2023    HDL 54 09/07/2023    LDL 53 09/07/2023    VLDL 31 (H) 09/07/2023    NONHDLC 87 09/07/2023      Lab Results   Component Value Date    TSH 1.830 09/07/2023                ASSESSMENT/PLAN:     Problem List Items Addressed This Visit       Acute cough - Primary     Continues to cough post URI    Plan    albuterol 108 (90 Base) MCG/ACT Inhalation Aero Soln          Inhale 2 puffs into the lungs every 6 (six) hours as needed for Wheezing (cough)., Normal, Disp-1 each, R-0       amoxicillin clavulanate 875-125 MG Oral Tab         Take 1 tablet by mouth 2 (two) times daily for 10 days., Normal, Disp-20 tablet, R-0       benzonatate 100 MG Oral Cap         Take 1 capsule (100 mg total) by mouth 3 (three) times daily as needed for cough., Normal, Disp-20 capsule, R-0       XR CHEST PA + LAT CHEST (CPT=71046)         [facility] - No RFL, Routine, Future, Expected: 1/16/2025 Approximate, Expires: 1/16/2026     Will repeat Chest X-ray if cough continues post Treatment plan           Relevant Medications    amoxicillin clavulanate 875-125 MG Oral Tab    albuterol 108 (90 Base) MCG/ACT Inhalation Aero Soln    benzonatate 100 MG Oral Cap    Other Relevant Orders    XR CHEST PA + LAT CHEST (VIG=[id number])          No orders of the defined types were placed in this encounter.      Meds This Visit:  Requested Prescriptions     Signed Prescriptions Disp Refills    amoxicillin  clavulanate 875-125 MG Oral Tab 20 tablet 0     Sig: Take 1 tablet by mouth 2 (two) times daily for 10 days.    albuterol 108 (90 Base) MCG/ACT Inhalation Aero Soln 1 each 0     Sig: Inhale 2 puffs into the lungs every 6 (six) hours as needed for Wheezing (cough).    benzonatate 100 MG Oral Cap 20 capsule 0     Sig: Take 1 capsule (100 mg total) by mouth 3 (three) times daily as needed for cough.       Imaging & Referrals:  XR CHEST PA + LAT CHEST (QJU=[id number])         JEFF Islas          [1] No Known Allergies

## 2025-01-16 NOTE — ASSESSMENT & PLAN NOTE
Continues to cough post URI    Plan    albuterol 108 (90 Base) MCG/ACT Inhalation Aero Soln          Inhale 2 puffs into the lungs every 6 (six) hours as needed for Wheezing (cough)., Normal, Disp-1 each, R-0       amoxicillin clavulanate 875-125 MG Oral Tab         Take 1 tablet by mouth 2 (two) times daily for 10 days., Normal, Disp-20 tablet, R-0       benzonatate 100 MG Oral Cap         Take 1 capsule (100 mg total) by mouth 3 (three) times daily as needed for cough., Normal, Disp-20 capsule, R-0       XR CHEST PA + LAT CHEST (CPT=71046)         [facility] - No RFL, Routine, Future, Expected: 1/16/2025 Approximate, Expires: 1/16/2026     Will repeat Chest X-ray if cough continues post Treatment plan

## 2025-02-20 RX ORDER — ALBUTEROL SULFATE 90 UG/1
2 INHALANT RESPIRATORY (INHALATION) EVERY 6 HOURS PRN
Qty: 8.5 G | Refills: 0 | Status: SHIPPED | OUTPATIENT
Start: 2025-02-20

## 2025-03-09 DIAGNOSIS — K21.9 GASTROESOPHAGEAL REFLUX DISEASE WITHOUT ESOPHAGITIS: ICD-10-CM

## 2025-03-12 RX ORDER — OMEPRAZOLE 20 MG/1
20 CAPSULE, DELAYED RELEASE ORAL DAILY
Qty: 90 CAPSULE | Refills: 3 | Status: SHIPPED | OUTPATIENT
Start: 2025-03-12

## 2025-03-17 ENCOUNTER — MED REC SCAN ONLY (OUTPATIENT)
Dept: INTERNAL MEDICINE CLINIC | Facility: CLINIC | Age: 75
End: 2025-03-17

## 2025-03-22 DIAGNOSIS — F32.5 MAJOR DEPRESSIVE DISORDER IN FULL REMISSION, UNSPECIFIED WHETHER RECURRENT: ICD-10-CM

## 2025-03-24 DIAGNOSIS — F32.5 MAJOR DEPRESSIVE DISORDER IN FULL REMISSION, UNSPECIFIED WHETHER RECURRENT: ICD-10-CM

## 2025-03-26 RX ORDER — FLUOXETINE 10 MG/1
10 TABLET, FILM COATED ORAL DAILY
Qty: 90 TABLET | Refills: 3 | Status: SHIPPED | OUTPATIENT
Start: 2025-03-26

## 2025-03-26 RX ORDER — FLUOXETINE 10 MG/1
10 TABLET, FILM COATED ORAL DAILY
Qty: 90 TABLET | Refills: 1 | OUTPATIENT
Start: 2025-03-26

## 2025-03-26 NOTE — TELEPHONE ENCOUNTER
Refill passed per [facility] protocols.    Requested Prescriptions   Pending Prescriptions Disp Refills    FLUOXETINE 10 MG Oral Tab [Pharmacy Med Name: FLUOXETINE 10MG TABLETS] 90 tablet 3     Sig: TAKE 1 TABLET(10 MG) BY MOUTH DAILY       Psychiatric Non-Scheduled (Anti-Anxiety) Passed - 3/26/2025  4:36 PM

## 2025-04-16 ENCOUNTER — TELEPHONE (OUTPATIENT)
Dept: INTERNAL MEDICINE CLINIC | Facility: CLINIC | Age: 75
End: 2025-04-16

## 2025-06-17 ENCOUNTER — MED REC SCAN ONLY (OUTPATIENT)
Dept: INTERNAL MEDICINE CLINIC | Facility: CLINIC | Age: 75
End: 2025-06-17

## 2025-07-16 ENCOUNTER — LAB ENCOUNTER (OUTPATIENT)
Dept: LAB | Age: 75
End: 2025-07-16
Attending: INTERNAL MEDICINE
Payer: MEDICARE

## 2025-07-16 ENCOUNTER — OFFICE VISIT (OUTPATIENT)
Dept: INTERNAL MEDICINE CLINIC | Facility: CLINIC | Age: 75
End: 2025-07-16
Payer: MEDICARE

## 2025-07-16 VITALS
SYSTOLIC BLOOD PRESSURE: 122 MMHG | WEIGHT: 213 LBS | BODY MASS INDEX: 29.82 KG/M2 | DIASTOLIC BLOOD PRESSURE: 76 MMHG | TEMPERATURE: 98 F | HEART RATE: 68 BPM | HEIGHT: 71 IN | OXYGEN SATURATION: 94 %

## 2025-07-16 DIAGNOSIS — E66.09 OBESITY DUE TO EXCESS CALORIES WITHOUT SERIOUS COMORBIDITY, UNSPECIFIED CLASS: ICD-10-CM

## 2025-07-16 DIAGNOSIS — R73.03 PREDIABETES: ICD-10-CM

## 2025-07-16 DIAGNOSIS — I10 PRIMARY HYPERTENSION: ICD-10-CM

## 2025-07-16 DIAGNOSIS — N40.0 BENIGN PROSTATIC HYPERPLASIA WITHOUT LOWER URINARY TRACT SYMPTOMS: ICD-10-CM

## 2025-07-16 DIAGNOSIS — Z12.5 SCREENING PSA (PROSTATE SPECIFIC ANTIGEN): ICD-10-CM

## 2025-07-16 DIAGNOSIS — G47.33 OSA ON CPAP: ICD-10-CM

## 2025-07-16 DIAGNOSIS — K21.9 GASTROESOPHAGEAL REFLUX DISEASE WITHOUT ESOPHAGITIS: ICD-10-CM

## 2025-07-16 DIAGNOSIS — E78.5 DYSLIPIDEMIA: Primary | ICD-10-CM

## 2025-07-16 DIAGNOSIS — M15.0 PRIMARY OSTEOARTHRITIS INVOLVING MULTIPLE JOINTS: ICD-10-CM

## 2025-07-16 PROBLEM — E88.819 INSULIN RESISTANCE: Status: RESOLVED | Noted: 2021-06-30 | Resolved: 2025-07-16

## 2025-07-16 PROBLEM — R05.1 ACUTE COUGH: Status: RESOLVED | Noted: 2025-01-16 | Resolved: 2025-07-16

## 2025-07-16 LAB
ALBUMIN SERPL-MCNC: 4.8 G/DL (ref 3.2–4.8)
ALBUMIN/GLOB SERPL: 1.8 {RATIO} (ref 1–2)
ALP LIVER SERPL-CCNC: 68 U/L (ref 45–117)
ALT SERPL-CCNC: 17 U/L (ref 10–49)
ANION GAP SERPL CALC-SCNC: 10 MMOL/L (ref 0–18)
AST SERPL-CCNC: 16 U/L (ref ?–34)
BILIRUB SERPL-MCNC: 1 MG/DL (ref 0.2–1.1)
BUN BLD-MCNC: 28 MG/DL (ref 9–23)
BUN/CREAT SERPL: 23 (ref 10–20)
CALCIUM BLD-MCNC: 9.4 MG/DL (ref 8.7–10.4)
CHLORIDE SERPL-SCNC: 102 MMOL/L (ref 98–112)
CHOLEST SERPL-MCNC: 171 MG/DL (ref ?–200)
CO2 SERPL-SCNC: 27 MMOL/L (ref 21–32)
CREAT BLD-MCNC: 1.22 MG/DL (ref 0.7–1.3)
DEPRECATED RDW RBC AUTO: 44.2 FL (ref 35.1–46.3)
EGFRCR SERPLBLD CKD-EPI 2021: 62 ML/MIN/1.73M2 (ref 60–?)
ERYTHROCYTE [DISTWIDTH] IN BLOOD BY AUTOMATED COUNT: 13.2 % (ref 11–15)
EST. AVERAGE GLUCOSE BLD GHB EST-MCNC: 108 MG/DL (ref 68–126)
FASTING PATIENT LIPID ANSWER: NO
FASTING STATUS PATIENT QL REPORTED: NO
GLOBULIN PLAS-MCNC: 2.6 G/DL (ref 2–3.5)
GLUCOSE BLD-MCNC: 111 MG/DL (ref 70–99)
HBA1C MFR BLD: 5.4 % (ref ?–5.7)
HCT VFR BLD AUTO: 46.2 % (ref 39–53)
HDLC SERPL-MCNC: 81 MG/DL (ref 40–59)
HGB BLD-MCNC: 15.5 G/DL (ref 13–17.5)
LDLC SERPL CALC-MCNC: 79 MG/DL (ref ?–100)
MCH RBC QN AUTO: 30.5 PG (ref 26–34)
MCHC RBC AUTO-ENTMCNC: 33.5 G/DL (ref 31–37)
MCV RBC AUTO: 90.8 FL (ref 80–100)
NONHDLC SERPL-MCNC: 90 MG/DL (ref ?–130)
OSMOLALITY SERPL CALC.SUM OF ELEC: 294 MOSM/KG (ref 275–295)
PLATELET # BLD AUTO: 281 10(3)UL (ref 150–450)
POTASSIUM SERPL-SCNC: 3.8 MMOL/L (ref 3.5–5.1)
PROT SERPL-MCNC: 7.4 G/DL (ref 5.7–8.2)
RBC # BLD AUTO: 5.09 X10(6)UL (ref 3.8–5.8)
SODIUM SERPL-SCNC: 139 MMOL/L (ref 136–145)
T3FREE SERPL-MCNC: 2.44 PG/ML (ref 2.4–4.2)
T4 FREE SERPL-MCNC: 1.2 NG/DL (ref 0.8–1.7)
TRIGL SERPL-MCNC: 52 MG/DL (ref 30–149)
TSI SER-ACNC: 0.46 UIU/ML (ref 0.55–4.78)
VLDLC SERPL CALC-MCNC: 8 MG/DL (ref 0–30)
WBC # BLD AUTO: 11.9 X10(3) UL (ref 4–11)

## 2025-07-16 PROCEDURE — 84481 FREE ASSAY (FT-3): CPT

## 2025-07-16 PROCEDURE — 80053 COMPREHEN METABOLIC PANEL: CPT

## 2025-07-16 PROCEDURE — 84439 ASSAY OF FREE THYROXINE: CPT

## 2025-07-16 PROCEDURE — 36415 COLL VENOUS BLD VENIPUNCTURE: CPT

## 2025-07-16 PROCEDURE — 80061 LIPID PANEL: CPT

## 2025-07-16 PROCEDURE — 83036 HEMOGLOBIN GLYCOSYLATED A1C: CPT

## 2025-07-16 PROCEDURE — 84443 ASSAY THYROID STIM HORMONE: CPT

## 2025-07-16 PROCEDURE — 85027 COMPLETE CBC AUTOMATED: CPT

## 2025-07-16 RX ORDER — ROPINIROLE 0.5 MG/1
1 TABLET, FILM COATED ORAL
COMMUNITY

## 2025-07-16 RX ORDER — SEMAGLUTIDE 0.25 MG/.5ML
INJECTION, SOLUTION SUBCUTANEOUS
COMMUNITY
Start: 2025-07-10

## 2025-07-18 NOTE — PROGRESS NOTES
Subjective:   Zach Aguilera is a 74 year old male who presents for a Subsequent Annual Wellness visit (Pt already had Initial Annual Wellness) and scheduled follow up of multiple significant but stable problems.   History of Present Illness    74-year-old gentleman here for Medicare annual wellness visit.  He spends majority of his time in [place] now.  No falls reported.  He does have depression and it has been stable.  He is going to see a therapist.  He continues to follow-up with urology outside of [facility].  History/Other:   Fall Risk Assessment:   He has been screened for Falls and is low risk.      Cognitive Assessment:   He had a completely normal cognitive assessment - see flowsheet entries     Functional Ability/Status:   Zach Aguilera has a completely normal functional assessment. See flowsheet for details.      Depression Screening (PHQ):  PHQ-2 SCORE: 1  , done 7/16/2025   Feeling down, depressed, or hopeless: 1         <5 minutes spent screening and counseling for depression    Advanced Directives:   He does have a Living Will but we do NOT have it on file in Epic.    He does have a POA but we do NOT have it on file in Epic.    Discussed Advance Care Planning with patient (and family/surrogate if present). Standard forms made available to patient in After Visit Summary.      Problem List[1]  Allergies:  He has no known allergies.    Current Medications:  Active Meds, Sig Only[2]    Medical History:  He  has a past medical history of Anxiety, Depression, Esophageal reflux, Hearing impairment, High cholesterol, Hypercholesteremia, Insulin resistance (06/30/2021), Obesity (BMI 30-39.9), Obesity (BMI 30-39.9) (06/30/2021), HEYDI on CPAP, Osteoarthritis, Primary osteoarthritis of both knees, Primary osteoarthritis of both knees, RLS (restless legs syndrome), Sleep apnea, and Weight gain (10/14/2021).  Surgical History:  He  has a past surgical history that includes tonsillectomy; colonoscopy; and upper  gi endoscopy,exam.   Family History:  His family history includes Heart Disorder in his father; alzhiemers in his mother.  Social History:  He  reports that he has quit smoking. He has never used smokeless tobacco. He reports current alcohol use of about 7.0 standard drinks of alcohol per week. He reports that he does not use drugs.    Tobacco:  He smoked tobacco in the past but quit greater than 12 months ago.  Tobacco Use[3]     CAGE Alcohol Screen:   CAGE screening score of 0 on 7/16/2025, showing low risk of alcohol abuse.      Patient Care Team:  Leon Nicholas MD as PCP - General (Internal Medicine)  Yasemin Mendes PT as Physical Therapist (Physical Therapy)  Oleg Brooks MD as Consulting Physician (BARIATRICS)    Review of Systems   Constitutional:  Negative for activity change, appetite change and fever.   HENT:  Negative for congestion and voice change.    Respiratory:  Negative for cough and shortness of breath.    Cardiovascular:  Negative for chest pain.   Gastrointestinal:  Negative for abdominal distention, abdominal pain and vomiting.   Genitourinary:  Negative for hematuria.   Skin:  Negative for wound.   Psychiatric/Behavioral:  Negative for behavioral problems.          Objective:   Physical Exam  Constitutional:       Appearance: Normal appearance.   HENT:      Head: Normocephalic.   Eyes:      Conjunctiva/sclera: Conjunctivae normal.   Cardiovascular:      Rate and Rhythm: Normal rate and regular rhythm.      Heart sounds: Normal heart sounds. No murmur heard.  Pulmonary:      Effort: Pulmonary effort is normal.      Breath sounds: Normal breath sounds. No rhonchi or rales.   Abdominal:      General: Bowel sounds are normal.      Palpations: Abdomen is soft.      Tenderness: There is no abdominal tenderness.   Musculoskeletal:      Cervical back: Neck supple.      Right lower leg: No edema.      Left lower leg: No edema.   Skin:     General: Skin is warm and dry.   Neurological:      General:  No focal deficit present.      Mental Status: He is alert and oriented to person, place, and time. Mental status is at baseline.   Psychiatric:         Mood and Affect: Mood normal.         Behavior: Behavior normal.         /76   Pulse 68   Temp 97.8 °F (36.6 °C) (Temporal)   Ht 5' 11\" (1.803 m)   Wt 213 lb (96.6 kg)   SpO2 94%   BMI 29.71 kg/m²  Estimated body mass index is 29.71 kg/m² as calculated from the following:    Height as of this encounter: 5' 11\" (1.803 m).    Weight as of this encounter: 213 lb (96.6 kg).    Medicare Hearing Assessment:   Hearing Screening    Time taken: 7/16/2025  9:42 AM  Screening Method: Questionnaire  I have a problem hearing over the telephone: No I have trouble following the conversations when two or more people are talking at the same time: No   I have trouble understanding things on the TV: No I have to strain to understand conversations: No   I have to worry about missing the telephone ring or doorbell: No I have trouble hearing conversations in a noisy background such as a crowded room or restaurant: No   I get confused about where sounds come from: No I misunderstand some words in a sentence and need to ask people to repeat themselves: No   I especially have trouble understanding the speech of women and children: No I have trouble understanding the speaker in a large room such as at a meeting or place of Quaker: No   Many people I talk to seem to mumble (or don't speak clearly): No People get annoyed because I misunderstand what they say: No   I misunderstand what others are saying and make inappropriate responses: No I avoid social activities because I cannot hear well and fear I will reply improperly: No   Family members and friends have told me they think I may have hearing loss: No                   Assessment & Plan:   Zach Aguilera is a 74 year old male who presents for a Medicare Assessment.     1. Dyslipidemia (Primary) continue statins.  Monitor  lipid profile LFTs.  2. Primary hypertension blood pressure is controlled.  -     CBC, Platelet; No Differential; Future; Expected date: 07/16/2025  -     Comp Metabolic Panel (14); Future; Expected date: 07/16/2025  -     Hemoglobin A1C; Future; Expected date: 07/16/2025  -     Lipid Panel; Future; Expected date: 07/16/2025  -     TSH W Reflex To Free T4; Future; Expected date: 07/16/2025  -     PSA Total, Screen; Future; Expected date: 07/16/2025  3. Obesity due to excess calories without serious comorbidity, unspecified class-he is currently under the care of weight loss doctor.  Taking Wegovy   4. Gastroesophageal reflux disease without esophagitis stable  5. Primary osteoarthritis involving multiple joints stable with on and off flareups.  6. Benign prostatic hyperplasia without lower urinary tract symptoms stable.  7. HEYDI on CPAP he has been using his CPAP regularly.  Overview:  On PAP - Follows up with pulm ( Dr nAg and also in [place])  8. Screening PSA (prostate specific antigen)  -     PSA Total, Screen; Future; Expected date: 07/16/2025  9. Prediabetes monitor A1c.  Avoid sweets, minimize carbohydrates, exercise as tolerated.  -     Hemoglobin A1C; Future; Expected date: 07/16/2025    Assessment & Plan    The patient indicates understanding of these issues and agrees to the plan.  Reinforced healthy diet, lifestyle, and exercise.      No follow-ups on file.     Leon Nicholas MD, 7/18/2025     Supplementary Documentation:   General Health:  In the past six months, have you lost more than 10 pounds without trying?: 2 - No  Has your appetite been poor?: Yes  Type of Diet: Other, Balanced  How does the patient maintain a good energy level?: Other  How would you describe your daily physical activity?: Light  How would you describe your current health state?: Good  How do you maintain positive mental well-being?: Social Interaction, Visiting Family, Visiting Friends  On a scale of 0 to 10, with 0 being no pain  and 10 being severe pain, what is your pain level?: 4 - (Moderate)  In the past six months, have you experienced urine leakage?: 0-No  At any time do you feel concerned for the safety/well-being of yourself and/or your children, in your home or elsewhere?: No  Have you had any immunizations at another office such as Influenza, Hepatitis B, Tetanus, or Pneumococcal?: No    Health Maintenance   Topic Date Due    Annual Physical  08/31/2024    COVID-19 Vaccine (8 - Moderna risk 2024-25 season) 05/22/2025    Influenza Vaccine (1) 10/01/2025    PSA  06/06/2026    Colorectal Cancer Screening  11/16/2027    Annual Depression Screening  Completed    Fall Risk Screening (Annual)  Completed    Pneumococcal Vaccine: 50+ Years  Completed    Zoster Vaccines  Completed    Meningococcal B Vaccine  Aged Out            [1]   Patient Active Problem List  Diagnosis    Gastroesophageal reflux disease without esophagitis    Dyslipidemia    Primary osteoarthritis of both knees    Herpes simplex infection of penis    Obesity due to excess calories without serious comorbidity    Benign prostatic hyperplasia without lower urinary tract symptoms    HEYDI on CPAP    Encounter for therapeutic drug monitoring    Elevated PSA    Primary osteoarthritis involving multiple joints    Primary hypertension    Major depressive disorder in full remission    Prediabetes   [2]   Outpatient Medications Marked as Taking for the 7/16/25 encounter (Office Visit) with Leon Nicholas MD   Medication Sig    semaglutide-weight management (WEGOVY) 0.25 MG/0.5ML Subcutaneous Solution Auto-injector Inject 0.25 mg every week by subcutaneous route for 28 days.    FLUoxetine 10 MG Oral Tab Take 1 tablet (10 mg total) by mouth daily.    omeprazole 20 MG Oral Capsule Delayed Release Take 1 capsule (20 mg total) by mouth daily.    albuterol 108 (90 Base) MCG/ACT Inhalation Aero Soln Inhale 2 puffs into the lungs every 6 (six) hours as needed for Wheezing.    atorvastatin  10 MG Oral Tab Take 1 tablet (10 mg total) by mouth daily.    buPROPion  MG Oral Tablet 24 Hr Take 1 tablet (300 mg total) by mouth daily.   [3]   Social History  Tobacco Use   Smoking Status Former   Smokeless Tobacco Never   Tobacco Comments    Quit 30 years ago

## 2025-08-07 ENCOUNTER — MED REC SCAN ONLY (OUTPATIENT)
Dept: INTERNAL MEDICINE CLINIC | Facility: CLINIC | Age: 75
End: 2025-08-07

## (undated) NOTE — LETTER
[facility], [address] [facility], [address]  [phone]           Patient Information:  Patient Name:  Teresita Bhatti ([id number]) Sex: male : 1950   ______________

## (undated) NOTE — Clinical Note
Saw Merlin Pang today.   Referral from Chloe Salamanca    Starting him on Diethylpropion for appetite control    Will give him hydrochlorothiazide PRN    Thanks    Ashley Ornelas

## (undated) NOTE — LETTER
[facility], [address]  [phone]                Patient Name:   Yfn Horn ([id number])   Sex: male  : 1950       Order Date:

## (undated) NOTE — LETTER
Patient Name: Zach Aguilera DOB-Age / Sex: 1950-A: 73 y  male  Medical Records: [id number] CSN: [id number]    Request for History & Physical for Radiology Procedure at [facility]    The above patient is scheduled to have a procedure performed in Radiology.  In order for the procedure to be performed safely, a comprehensive History & Physical, to include the Review of Systems, is required within 30 days of the scheduled appointment.      Procedure:     Date Scheduled: 24 Ordered by (Ordering Physician): Dr Nicholas    Please FAX the completed H&P to [facility] at [phone].  For Questions: Call [facility] [phone]    If you cannot provide us with a comprehensive History & Physical prior to this appointment, you will need to cancel and reschedule the appointment by calling Central Scheduling [phone].  Thank you.

## (undated) NOTE — Clinical Note
I am going to increase his wellbutrin to 300 mg. I also asked him to touch base with his therapist again.